# Patient Record
Sex: MALE | Race: WHITE | NOT HISPANIC OR LATINO | URBAN - METROPOLITAN AREA
[De-identification: names, ages, dates, MRNs, and addresses within clinical notes are randomized per-mention and may not be internally consistent; named-entity substitution may affect disease eponyms.]

---

## 2021-05-23 ENCOUNTER — INPATIENT (INPATIENT)
Facility: HOSPITAL | Age: 66
LOS: 2 days | Discharge: HOME | DRG: 444 | End: 2021-05-26
Attending: SURGERY | Admitting: SURGERY
Payer: MEDICARE

## 2021-05-23 VITALS
RESPIRATION RATE: 18 BRPM | HEIGHT: 72 IN | HEART RATE: 74 BPM | OXYGEN SATURATION: 97 % | SYSTOLIC BLOOD PRESSURE: 167 MMHG | WEIGHT: 177.03 LBS | DIASTOLIC BLOOD PRESSURE: 76 MMHG | TEMPERATURE: 98 F

## 2021-05-23 LAB
AFP-TM SERPL-MCNC: 8.1 NG/ML — SIGNIFICANT CHANGE UP
ALBUMIN SERPL ELPH-MCNC: 3.8 G/DL — SIGNIFICANT CHANGE UP (ref 3.3–5)
ALP SERPL-CCNC: 1258 U/L — HIGH (ref 40–120)
ALT FLD-CCNC: 141 U/L — HIGH (ref 10–45)
ANION GAP SERPL CALC-SCNC: 11 MMOL/L — SIGNIFICANT CHANGE UP (ref 5–17)
APPEARANCE UR: ABNORMAL
APTT BLD: 41.1 SEC — HIGH (ref 27.5–35.5)
APTT BLD: 79.6 SEC — HIGH (ref 27.5–35.5)
AST SERPL-CCNC: 154 U/L — HIGH (ref 10–40)
BACTERIA # UR AUTO: SIGNIFICANT CHANGE UP /HPF
BASOPHILS # BLD AUTO: 0.07 K/UL — SIGNIFICANT CHANGE UP (ref 0–0.2)
BASOPHILS NFR BLD AUTO: 0.7 % — SIGNIFICANT CHANGE UP (ref 0–2)
BILIRUB SERPL-MCNC: 5.2 MG/DL — HIGH (ref 0.2–1.2)
BILIRUB UR-MCNC: ABNORMAL
BLD GP AB SCN SERPL QL: NEGATIVE — SIGNIFICANT CHANGE UP
BUN SERPL-MCNC: 14 MG/DL — SIGNIFICANT CHANGE UP (ref 7–23)
CALCIUM SERPL-MCNC: 9.4 MG/DL — SIGNIFICANT CHANGE UP (ref 8.4–10.5)
CANCER AG125 SERPL-ACNC: 8 U/ML — SIGNIFICANT CHANGE UP
CANCER AG19-9 SERPL-ACNC: 412 U/ML — HIGH
CEA SERPL-MCNC: 5.5 NG/ML — HIGH (ref 0–3.8)
CHLORIDE SERPL-SCNC: 101 MMOL/L — SIGNIFICANT CHANGE UP (ref 96–108)
CO2 SERPL-SCNC: 28 MMOL/L — SIGNIFICANT CHANGE UP (ref 22–31)
COD CRY URNS QL: ABNORMAL /HPF
COLOR SPEC: SIGNIFICANT CHANGE UP
COMMENT - URINE: SIGNIFICANT CHANGE UP
CREAT SERPL-MCNC: 1.04 MG/DL — SIGNIFICANT CHANGE UP (ref 0.5–1.3)
DIFF PNL FLD: NEGATIVE — SIGNIFICANT CHANGE UP
EOSINOPHIL # BLD AUTO: 0.12 K/UL — SIGNIFICANT CHANGE UP (ref 0–0.5)
EOSINOPHIL NFR BLD AUTO: 1.2 % — SIGNIFICANT CHANGE UP (ref 0–6)
EPI CELLS # UR: SIGNIFICANT CHANGE UP /HPF (ref 0–5)
GLUCOSE SERPL-MCNC: 104 MG/DL — HIGH (ref 70–99)
GLUCOSE UR QL: NEGATIVE — SIGNIFICANT CHANGE UP
GRAN CASTS # UR COMP ASSIST: ABNORMAL /LPF
HCT VFR BLD CALC: 35.6 % — LOW (ref 39–50)
HCT VFR BLD CALC: 39.1 % — SIGNIFICANT CHANGE UP (ref 39–50)
HGB BLD-MCNC: 11.6 G/DL — LOW (ref 13–17)
HGB BLD-MCNC: 12.9 G/DL — LOW (ref 13–17)
IMM GRANULOCYTES NFR BLD AUTO: 0.2 % — SIGNIFICANT CHANGE UP (ref 0–1.5)
INR BLD: 1.32 — HIGH (ref 0.88–1.16)
KETONES UR-MCNC: NEGATIVE — SIGNIFICANT CHANGE UP
LEUKOCYTE ESTERASE UR-ACNC: NEGATIVE — SIGNIFICANT CHANGE UP
LIDOCAIN IGE QN: 29 U/L — SIGNIFICANT CHANGE UP (ref 7–60)
LYMPHOCYTES # BLD AUTO: 1.64 K/UL — SIGNIFICANT CHANGE UP (ref 1–3.3)
LYMPHOCYTES # BLD AUTO: 16.3 % — SIGNIFICANT CHANGE UP (ref 13–44)
MCHC RBC-ENTMCNC: 28.6 PG — SIGNIFICANT CHANGE UP (ref 27–34)
MCHC RBC-ENTMCNC: 29.2 PG — SIGNIFICANT CHANGE UP (ref 27–34)
MCHC RBC-ENTMCNC: 32.6 GM/DL — SIGNIFICANT CHANGE UP (ref 32–36)
MCHC RBC-ENTMCNC: 33 GM/DL — SIGNIFICANT CHANGE UP (ref 32–36)
MCV RBC AUTO: 87.9 FL — SIGNIFICANT CHANGE UP (ref 80–100)
MCV RBC AUTO: 88.5 FL — SIGNIFICANT CHANGE UP (ref 80–100)
MONOCYTES # BLD AUTO: 0.63 K/UL — SIGNIFICANT CHANGE UP (ref 0–0.9)
MONOCYTES NFR BLD AUTO: 6.3 % — SIGNIFICANT CHANGE UP (ref 2–14)
NEUTROPHILS # BLD AUTO: 7.59 K/UL — HIGH (ref 1.8–7.4)
NEUTROPHILS NFR BLD AUTO: 75.3 % — SIGNIFICANT CHANGE UP (ref 43–77)
NITRITE UR-MCNC: NEGATIVE — SIGNIFICANT CHANGE UP
NRBC # BLD: 0 /100 WBCS — SIGNIFICANT CHANGE UP (ref 0–0)
NRBC # BLD: 0 /100 WBCS — SIGNIFICANT CHANGE UP (ref 0–0)
PH UR: 5.5 — SIGNIFICANT CHANGE UP (ref 5–8)
PLATELET # BLD AUTO: 285 K/UL — SIGNIFICANT CHANGE UP (ref 150–400)
PLATELET # BLD AUTO: 304 K/UL — SIGNIFICANT CHANGE UP (ref 150–400)
POTASSIUM SERPL-MCNC: 3.7 MMOL/L — SIGNIFICANT CHANGE UP (ref 3.5–5.3)
POTASSIUM SERPL-SCNC: 3.7 MMOL/L — SIGNIFICANT CHANGE UP (ref 3.5–5.3)
PROT SERPL-MCNC: 7.4 G/DL — SIGNIFICANT CHANGE UP (ref 6–8.3)
PROT UR-MCNC: 30 MG/DL
PROTHROM AB SERPL-ACNC: 15.6 SEC — HIGH (ref 10.6–13.6)
RBC # BLD: 4.05 M/UL — LOW (ref 4.2–5.8)
RBC # BLD: 4.42 M/UL — SIGNIFICANT CHANGE UP (ref 4.2–5.8)
RBC # FLD: 14 % — SIGNIFICANT CHANGE UP (ref 10.3–14.5)
RBC # FLD: 14.1 % — SIGNIFICANT CHANGE UP (ref 10.3–14.5)
RBC CASTS # UR COMP ASSIST: < 5 /HPF — SIGNIFICANT CHANGE UP
RH IG SCN BLD-IMP: POSITIVE — SIGNIFICANT CHANGE UP
SARS-COV-2 RNA SPEC QL NAA+PROBE: SIGNIFICANT CHANGE UP
SODIUM SERPL-SCNC: 140 MMOL/L — SIGNIFICANT CHANGE UP (ref 135–145)
SP GR SPEC: >=1.03 — SIGNIFICANT CHANGE UP (ref 1–1.03)
UROBILINOGEN FLD QL: 1 E.U./DL — SIGNIFICANT CHANGE UP
WBC # BLD: 10.07 K/UL — SIGNIFICANT CHANGE UP (ref 3.8–10.5)
WBC # BLD: 7.76 K/UL — SIGNIFICANT CHANGE UP (ref 3.8–10.5)
WBC # FLD AUTO: 10.07 K/UL — SIGNIFICANT CHANGE UP (ref 3.8–10.5)
WBC # FLD AUTO: 7.76 K/UL — SIGNIFICANT CHANGE UP (ref 3.8–10.5)
WBC UR QL: < 5 /HPF — SIGNIFICANT CHANGE UP

## 2021-05-23 PROCEDURE — 93010 ELECTROCARDIOGRAM REPORT: CPT

## 2021-05-23 PROCEDURE — 99285 EMERGENCY DEPT VISIT HI MDM: CPT | Mod: CS

## 2021-05-23 PROCEDURE — 76705 ECHO EXAM OF ABDOMEN: CPT | Mod: 26

## 2021-05-23 RX ORDER — SODIUM CHLORIDE 9 MG/ML
1000 INJECTION, SOLUTION INTRAVENOUS
Refills: 0 | Status: DISCONTINUED | OUTPATIENT
Start: 2021-05-23 | End: 2021-05-26

## 2021-05-23 RX ORDER — ACETAMINOPHEN 500 MG
650 TABLET ORAL EVERY 6 HOURS
Refills: 0 | Status: DISCONTINUED | OUTPATIENT
Start: 2021-05-23 | End: 2021-05-26

## 2021-05-23 RX ORDER — PIPERACILLIN AND TAZOBACTAM 4; .5 G/20ML; G/20ML
3.38 INJECTION, POWDER, LYOPHILIZED, FOR SOLUTION INTRAVENOUS ONCE
Refills: 0 | Status: COMPLETED | OUTPATIENT
Start: 2021-05-23 | End: 2021-05-23

## 2021-05-23 RX ORDER — PIPERACILLIN AND TAZOBACTAM 4; .5 G/20ML; G/20ML
3.38 INJECTION, POWDER, LYOPHILIZED, FOR SOLUTION INTRAVENOUS EVERY 6 HOURS
Refills: 0 | Status: DISCONTINUED | OUTPATIENT
Start: 2021-05-23 | End: 2021-05-25

## 2021-05-23 RX ORDER — METOPROLOL TARTRATE 50 MG
25 TABLET ORAL DAILY
Refills: 0 | Status: DISCONTINUED | OUTPATIENT
Start: 2021-05-23 | End: 2021-05-26

## 2021-05-23 RX ORDER — ATORVASTATIN CALCIUM 80 MG/1
20 TABLET, FILM COATED ORAL AT BEDTIME
Refills: 0 | Status: DISCONTINUED | OUTPATIENT
Start: 2021-05-23 | End: 2021-05-24

## 2021-05-23 RX ORDER — ONDANSETRON 8 MG/1
4 TABLET, FILM COATED ORAL EVERY 6 HOURS
Refills: 0 | Status: DISCONTINUED | OUTPATIENT
Start: 2021-05-23 | End: 2021-05-26

## 2021-05-23 RX ORDER — HEPARIN SODIUM 5000 [USP'U]/ML
1500 INJECTION INTRAVENOUS; SUBCUTANEOUS
Qty: 25000 | Refills: 0 | Status: DISCONTINUED | OUTPATIENT
Start: 2021-05-23 | End: 2021-05-24

## 2021-05-23 RX ADMIN — Medication 25 MILLIGRAM(S): at 16:04

## 2021-05-23 RX ADMIN — PIPERACILLIN AND TAZOBACTAM 200 GRAM(S): 4; .5 INJECTION, POWDER, LYOPHILIZED, FOR SOLUTION INTRAVENOUS at 21:58

## 2021-05-23 RX ADMIN — PIPERACILLIN AND TAZOBACTAM 200 GRAM(S): 4; .5 INJECTION, POWDER, LYOPHILIZED, FOR SOLUTION INTRAVENOUS at 17:48

## 2021-05-23 RX ADMIN — HEPARIN SODIUM 1400 UNIT(S)/HR: 5000 INJECTION INTRAVENOUS; SUBCUTANEOUS at 14:57

## 2021-05-23 NOTE — H&P ADULT - NSHPPHYSICALEXAM_GEN_ALL_CORE
Physical Exam:  General: NAD, jaundiced. Yellow sclera  Abdominal: soft, NT/ND. No scare.  Extremities: WWP.

## 2021-05-23 NOTE — H&P ADULT - ASSESSMENT
66 YO M with PMH s/f HTN, Afib (s/p ablation 2 years ago, on Eliquis) and no significant PSH. Patient has had 35-40 pounds weight loss in the last month a/w nausea, vomitings not associated with food, darkening of urine color and jaundice. Saw his PCP last week and elevated LFTs were noted. Patient got CT AP which revealed dilated intra and extrahepatic bile ducts, CBD 1.4 cm and a 1 cm nonobstructive CBD stone. Admitted for ERCP with Dr. Davis. Colonoscopy in April 2021 which revealed to polyps.    Plan  - Admit Team 5 under Dr. Burgos Tele  - MRCP today  - GI consult with Dr. Davis  - Holding Home Eliquis  - Therapeutic Hep drip  - Holding antibiotics  - Diet after MRCP  - Plan discussed with chief and attending

## 2021-05-23 NOTE — ED ADULT TRIAGE NOTE - CHIEF COMPLAINT QUOTE
Pt presents to ED, referred by MD Munguia for admission for confirmed Cholecystitis. PT denies pain, states N/V everytime he eats.

## 2021-05-23 NOTE — PATIENT PROFILE ADULT - NSPROMEDSDISPOSITION_GEN_A_NUR
in the pt's bag, doesn't have the original container to be sent to pharmacy, pt won't take any./bedside

## 2021-05-23 NOTE — ED PROVIDER NOTE - PHYSICAL EXAMINATION
CONSTITUTIONAL: Well-appearing; well-nourished; in no apparent distress.   HEAD: Normocephalic; atraumatic.   EYES:  +scleral icterus  ENT: normal nose; no rhinorrhea; normal pharynx with no erythema or lesions.   NECK: Supple; non-tender;   CARDIOVASCULAR: Normal S1, S2; no murmurs, rubs, or gallops. Regular rate and rhythm.   RESPIRATORY: Breathing easily; breath sounds clear and equal bilaterally; no wheezes, rhonchi, or rales.  GI: Soft; non-distended; non-tender; no palpable organomegaly.   EXT: No cyanosis or edema; N/V intact  SKIN: Normal for age and race; warm; dry; good turgor; no apparent lesions or rash.   NEURO: A & O x 3; face symmetric; grossly unremarkable.   PSYCHOLOGICAL: The patient’s mood and manner are appropriate.

## 2021-05-23 NOTE — PATIENT PROFILE ADULT - FUNCTIONAL SCREEN CURRENT LEVEL: COMMUNICATION, MLM
2 yo  LMP 2020 IUP 11wks 5 days presents to the ED last night w heavy vaginal bleeding at 2am this morning. Pt states that she began spotting yesterday around noon and around 2 am she was awoken with abd cramping and heavier bleeding w clots. May have passed fetus and placenta removed in the ED, sent to pathology. Evaluated by OB, discussed D&C vs Misoprostol. Misoprostol given and pt reevaluated 1 hr later. Stable for DC. Returns now to the ED stating that as soon as she got home, a gush of blood came out, w blood coming down the legs. Returned to the ER as she state she was advised to come back if bleeding recurs or gets worse. Pt denies fever, chills, pelvic pain, cp, sob, palpitations, n/v/d/c, lightheadedness, dizziness, or syncope. Offers no other complaints at this time. 0 = understands/communicates without difficulty

## 2021-05-23 NOTE — ED ADULT NURSE NOTE - OBJECTIVE STATEMENT
pt states that he was referred in by PCD with a dx of gallstones, pt feeling unwell x 1 month with pain , nausea ,wgt loss, pt appears jaundice with yellow sclera, denies any pain

## 2021-05-23 NOTE — H&P ADULT - HISTORY OF PRESENT ILLNESS
66 YO M with PMH s/f HTN, Afib (s/p ablation 2 years ago, on Eliquis) and no significant PSH. Patient has had 35-40 pounds weight loss in the last month a/w nausea, vomitings not associated with food, darkening of urine color and jaundice. Saw his PCP last week and elevated LFTs were noted. Patient got CT AP which revealed dilated intra and extrahepatic bile ducts, CBD 1.4 cm and a 1 cm nonobstructing CBD stone. Plan was for the patient to get admitted for a planned ERCP procedure and patient was asked to come to the ED today. Denies fevers but does expreince chills from time to time (last episode of chills last week). Colonoscopy in April 2021 which revealed to polyps.    In the ED, VSS. T. bili 5.2, ALK 1200, AST and ALT in 100s. CBS WNL.

## 2021-05-23 NOTE — ED PROVIDER NOTE - OBJECTIVE STATEMENT
64yo M referred to ED by surgeon Dr. Jae Burgos for CBD stone w/ biliary obstruction. pt w/ nausea and weight loss for several weeks, outpatient labs showed elevated LFTs, developed scleral icterus a few days ago. had outpatient US on Friday, called to come to the ED today by Dr. Burgos as it shows cbd stone. pt states never with any pain. denies skin jaundice.  hx afib s/p ablation, states has been in NSR since, but maintained on eliquis - held am dose today in anticipation of poss procedure.

## 2021-05-24 LAB
ALBUMIN SERPL ELPH-MCNC: 3.1 G/DL — LOW (ref 3.3–5)
ALP SERPL-CCNC: 1126 U/L — HIGH (ref 40–120)
ALT FLD-CCNC: 128 U/L — HIGH (ref 10–45)
ANION GAP SERPL CALC-SCNC: 11 MMOL/L — SIGNIFICANT CHANGE UP (ref 5–17)
AST SERPL-CCNC: 133 U/L — HIGH (ref 10–40)
BILIRUB SERPL-MCNC: 5.7 MG/DL — HIGH (ref 0.2–1.2)
BUN SERPL-MCNC: 9 MG/DL — SIGNIFICANT CHANGE UP (ref 7–23)
CALCIUM SERPL-MCNC: 9.1 MG/DL — SIGNIFICANT CHANGE UP (ref 8.4–10.5)
CHLORIDE SERPL-SCNC: 101 MMOL/L — SIGNIFICANT CHANGE UP (ref 96–108)
CO2 SERPL-SCNC: 27 MMOL/L — SIGNIFICANT CHANGE UP (ref 22–31)
COVID-19 SPIKE DOMAIN AB INTERP: POSITIVE
COVID-19 SPIKE DOMAIN ANTIBODY RESULT: >250 U/ML — HIGH
CREAT SERPL-MCNC: 0.97 MG/DL — SIGNIFICANT CHANGE UP (ref 0.5–1.3)
CULTURE RESULTS: NO GROWTH — SIGNIFICANT CHANGE UP
GLUCOSE SERPL-MCNC: 92 MG/DL — SIGNIFICANT CHANGE UP (ref 70–99)
HCT VFR BLD CALC: 35.8 % — LOW (ref 39–50)
HCV AB S/CO SERPL IA: 0.03 S/CO — SIGNIFICANT CHANGE UP
HCV AB SERPL-IMP: SIGNIFICANT CHANGE UP
HGB BLD-MCNC: 11.6 G/DL — LOW (ref 13–17)
MAGNESIUM SERPL-MCNC: 2 MG/DL — SIGNIFICANT CHANGE UP (ref 1.6–2.6)
MCHC RBC-ENTMCNC: 28.6 PG — SIGNIFICANT CHANGE UP (ref 27–34)
MCHC RBC-ENTMCNC: 32.4 GM/DL — SIGNIFICANT CHANGE UP (ref 32–36)
MCV RBC AUTO: 88.2 FL — SIGNIFICANT CHANGE UP (ref 80–100)
NRBC # BLD: 0 /100 WBCS — SIGNIFICANT CHANGE UP (ref 0–0)
PHOSPHATE SERPL-MCNC: 3.3 MG/DL — SIGNIFICANT CHANGE UP (ref 2.5–4.5)
PLATELET # BLD AUTO: 279 K/UL — SIGNIFICANT CHANGE UP (ref 150–400)
POTASSIUM SERPL-MCNC: 3.4 MMOL/L — LOW (ref 3.5–5.3)
POTASSIUM SERPL-SCNC: 3.4 MMOL/L — LOW (ref 3.5–5.3)
PROT SERPL-MCNC: 6.3 G/DL — SIGNIFICANT CHANGE UP (ref 6–8.3)
RBC # BLD: 4.06 M/UL — LOW (ref 4.2–5.8)
RBC # FLD: 13.8 % — SIGNIFICANT CHANGE UP (ref 10.3–14.5)
SARS-COV-2 IGG+IGM SERPL QL IA: >250 U/ML — HIGH
SARS-COV-2 IGG+IGM SERPL QL IA: POSITIVE
SODIUM SERPL-SCNC: 139 MMOL/L — SIGNIFICANT CHANGE UP (ref 135–145)
SPECIMEN SOURCE: SIGNIFICANT CHANGE UP
WBC # BLD: 7.14 K/UL — SIGNIFICANT CHANGE UP (ref 3.8–10.5)
WBC # FLD AUTO: 7.14 K/UL — SIGNIFICANT CHANGE UP (ref 3.8–10.5)

## 2021-05-24 PROCEDURE — 71045 X-RAY EXAM CHEST 1 VIEW: CPT | Mod: 26

## 2021-05-24 PROCEDURE — 99223 1ST HOSP IP/OBS HIGH 75: CPT | Mod: GC

## 2021-05-24 RX ORDER — LISINOPRIL 2.5 MG/1
0 TABLET ORAL
Qty: 0 | Refills: 0 | DISCHARGE

## 2021-05-24 RX ORDER — ROSUVASTATIN CALCIUM 5 MG/1
1 TABLET ORAL
Qty: 0 | Refills: 0 | DISCHARGE

## 2021-05-24 RX ORDER — METOPROLOL TARTRATE 50 MG
1 TABLET ORAL
Qty: 0 | Refills: 0 | DISCHARGE

## 2021-05-24 RX ORDER — HEPARIN SODIUM 5000 [USP'U]/ML
5000 INJECTION INTRAVENOUS; SUBCUTANEOUS EVERY 8 HOURS
Refills: 0 | Status: DISCONTINUED | OUTPATIENT
Start: 2021-05-24 | End: 2021-05-26

## 2021-05-24 RX ORDER — APIXABAN 2.5 MG/1
1 TABLET, FILM COATED ORAL
Qty: 0 | Refills: 0 | DISCHARGE

## 2021-05-24 RX ORDER — ROSUVASTATIN CALCIUM 5 MG/1
0 TABLET ORAL
Qty: 0 | Refills: 0 | DISCHARGE

## 2021-05-24 RX ORDER — METOPROLOL TARTRATE 50 MG
0 TABLET ORAL
Qty: 0 | Refills: 0 | DISCHARGE

## 2021-05-24 RX ORDER — APIXABAN 2.5 MG/1
0 TABLET, FILM COATED ORAL
Qty: 0 | Refills: 0 | DISCHARGE

## 2021-05-24 RX ORDER — POTASSIUM CHLORIDE 20 MEQ
10 PACKET (EA) ORAL
Refills: 0 | Status: COMPLETED | OUTPATIENT
Start: 2021-05-24 | End: 2021-05-24

## 2021-05-24 RX ORDER — LISINOPRIL 2.5 MG/1
1 TABLET ORAL
Qty: 0 | Refills: 0 | DISCHARGE

## 2021-05-24 RX ADMIN — HEPARIN SODIUM 5000 UNIT(S): 5000 INJECTION INTRAVENOUS; SUBCUTANEOUS at 22:00

## 2021-05-24 RX ADMIN — Medication 100 MILLIEQUIVALENT(S): at 09:57

## 2021-05-24 RX ADMIN — Medication 100 MILLIEQUIVALENT(S): at 10:40

## 2021-05-24 RX ADMIN — PIPERACILLIN AND TAZOBACTAM 200 GRAM(S): 4; .5 INJECTION, POWDER, LYOPHILIZED, FOR SOLUTION INTRAVENOUS at 04:57

## 2021-05-24 RX ADMIN — PIPERACILLIN AND TAZOBACTAM 200 GRAM(S): 4; .5 INJECTION, POWDER, LYOPHILIZED, FOR SOLUTION INTRAVENOUS at 17:00

## 2021-05-24 RX ADMIN — PIPERACILLIN AND TAZOBACTAM 200 GRAM(S): 4; .5 INJECTION, POWDER, LYOPHILIZED, FOR SOLUTION INTRAVENOUS at 11:22

## 2021-05-24 RX ADMIN — Medication 25 MILLIGRAM(S): at 08:31

## 2021-05-24 RX ADMIN — Medication 100 MILLIEQUIVALENT(S): at 08:55

## 2021-05-24 RX ADMIN — PIPERACILLIN AND TAZOBACTAM 200 GRAM(S): 4; .5 INJECTION, POWDER, LYOPHILIZED, FOR SOLUTION INTRAVENOUS at 22:21

## 2021-05-24 RX ADMIN — SODIUM CHLORIDE 100 MILLILITER(S): 9 INJECTION, SOLUTION INTRAVENOUS at 04:57

## 2021-05-24 RX ADMIN — SODIUM CHLORIDE 100 MILLILITER(S): 9 INJECTION, SOLUTION INTRAVENOUS at 15:42

## 2021-05-24 NOTE — CONSULT NOTE ADULT - ASSESSMENT
66 YO M with PMH s/f HTN, Afib (s/p ablation 2 years ago, on Eliquis) and no significant PSH. Patient has had 35-40 pounds weight loss in the last month a/w nausea, vomitings not associated with food, darkening of urine color and jaundice. GI consulted for choledocholithiasis.     #Choledocholithiasis  - reportedly had outpatient imaging that showed 1 cm non-obstructing CBD stone  - prelim U/S inpatient shows dilated ducts, but no obvious stone  - cholestatic injury on labs  - please hold Eliquis  - please hold heparin gtt at midnight tonight and make patient NPO past midnight for ERCP tomorrow  - f/u MRCP and tumor markers given weight loss and painless jaundice     d/w Dr. Taveras and Dr. Ryan Sutherland MD  PGY-4, Gastroenterology Fellow  pager: [phone]
66 YO M with PMH s/f HTN, Afib. CT AP which revealed dilated intra and extrahepatic bile ducts, CBD 1.4 cm and a 1 cm nonobstructive CBD stone. Admitted for ERCP. Medicine consulted for preop eval for lap gail tomorrow.     #Choledocholithiasis   Patient s/p ERCP,  11 mm CBD stone visualized in distal CBD and CBD dilated to 13 mm s/p sphincterotomy and balloon sweep  Plan per primary team   CW IV abx per team   pain control, trend CMP    #Preop Eval  METs>10, performs ADLs independently   RCRI 1, Class II risk for KLALIE   Schumacher 0.0% KALLIE  Can proceed to OR without further cardiac workup     #Afib   On eliquis and Toprol at home   Hold eliquis preop, resume post op per primary team   CW Toprol 25mg QD     #HTN   On lisinopril 20mg QD at home  Hold preop at this time   If SBP>180, can consider IV labetolol 10mg or hydral 10mg IVP  Resume lisinopril post op   Ensure adequate pain control prior to treating BP    #HLD   On crestor 20mg QHS, hold in setting of transaminitis     #Anemia   No e/o bleeding at this time   Continue to trend CBC   monitor BM - had one today and notes it was normal    #Hypokalemia   Replete >4

## 2021-05-24 NOTE — DIETITIAN INITIAL EVALUATION ADULT. - OTHER INFO
65M w/ PMH s/f HTN, Afib (s/p ablation 2 years ago, on Eliquis) and no significant PSH. The patient has had 35-40 pounds weight loss in the last month a/w nausea, vomiting, not associated w/ food, darkening of urine color and jaundice. Saw his PCP last week and elevated LFTs were noted. He got CT A/P, which revealed dilated intra- and extra-hepatic bile ducts, CBD 1.4 cm and a 1 cm non-obstructive CBD stone. Plan for ERCP today 5/24.     On assessment, pt resting in bed without complaints. Currently NPO for testing today. No reported n/v/d/c since admission. Last BM 5/22. Skin WDL, naomi score 22. No pain reported at time of assessment. Pt reported over the last month, he has had a significant amount of wt loss. Noted UBW was 220lbs and now admitting at 177lbs revealing a -43lb/ 19.5% weight loss over 1 month. Noted that appetite was good but had frequent N/V after dinner and some lunches. Suspect meeting <75% est needs x1 month. NFPE was unremarkable. Per ASPEN guidelines, pt meets criteria for severe malnutrition- team made aware. Pt onboard with deferring education until diet is advanced- RD to follow. Pertinent Labs; K 3.4L, ALK Phos 1126H, AST 133H, ALT 128H. Cont to monitor lytes and replete prn. Please see nutr recs below. RD to follow.

## 2021-05-24 NOTE — DIETITIAN NUTRITION RISK NOTIFICATION - ADDITIONAL COMMENTS/DIETITIAN RECOMMENDATIONS
1. NPO  >> Recommend advance to low fat diet when medically feasible  2. pain and bowel regime per team   3. Cont to monitor lytes and replete prn   4. RD diet edu prn

## 2021-05-24 NOTE — DIETITIAN INITIAL EVALUATION ADULT. - OTHER CALCULATIONS
ABW used for calculations as pt between % of IBW. (99%). Needs adjusted for advanced age and severe malnutrition.

## 2021-05-24 NOTE — CONSULT NOTE ADULT - SUBJECTIVE AND OBJECTIVE BOX
Patient is a 65y old  Male who presents with a chief complaint of Jaundice 2/2 non-obstructive (24 May 2021 13:48)    HPI:  66 YO M with PMH s/f HTN, Afib (s/p ablation 2 years ago, on Eliquis) and no significant PSH. Patient has had 35-40 pounds weight loss in the last month a/w nausea, vomitings not associated with food, darkening of urine color and jaundice. Saw his PCP last week and elevated LFTs were noted. Patient got CT AP which revealed dilated intra and extrahepatic bile ducts, CBD 1.4 cm and a 1 cm nonobstructing CBD stone. Plan was for the patient to get admitted for a planned ERCP procedure and patient was asked to come to the ED today. Denies fevers but does expreince chills from time to time (last episode of chills last week). Colonoscopy in April 2021 which revealed to polyps.    In the ED, VSS. T. bili 5.2, ALK 1200, AST and ALT in 100s. CBS WNL.   (23 May 2021 13:26)      INTERVAL HPI/OVERNIGHT EVENTS:  Patient was seen and examined at bedside. As per nurse and patient, no o/n events, patient resting comfortably. No complaints at this time. States that he feels as though he is back at his baseline. Patient denies: fever, chills, dizziness, weakness, HA, Changes in vision, CP, palpitations, SOB, cough, N/V/D/C, dysuria, changes in bowel movements, LE edema.      PAST MEDICAL & SURGICAL HISTORY:  Hypertension    High cholesterol    Atrial fibrillation    No significant past surgical history        SOCIAL HISTORY  Alcohol: denies  Tobacco: denies  Illicit substance use: denies      FAMILY HISTORY:    REVIEW OF SYSTEMS:  CONSTITUTIONAL: No fever, weight loss, or fatigue  EYES: No eye pain, visual disturbances, or discharge  ENMT:  No difficulty hearing, tinnitus, vertigo; No sinus or throat pain  NECK: No pain or stiffness  RESPIRATORY: No cough, wheezing, chills or hemoptysis; No shortness of breath  CARDIOVASCULAR: No chest pain, palpitations, dizziness, or leg swelling  GASTROINTESTINAL: No abdominal or epigastric pain. No nausea, vomiting, or hematemesis; No diarrhea or constipation. No melena or hematochezia.  GENITOURINARY: No dysuria, frequency, hematuria, or incontinence  NEUROLOGICAL: No headaches, memory loss, loss of strength, numbness, or tremors  SKIN: No itching, burning, rashes, or lesions   LYMPH NODES: No enlarged glands  ENDOCRINE: No heat or cold intolerance; No hair loss  MUSCULOSKELETAL: No joint pain or swelling; No muscle, back, or extremity pain  PSYCHIATRIC: No depression, anxiety, mood swings, or difficulty sleeping  HEME/LYMPH: No easy bruising, or bleeding gums  ALLERY AND IMMUNOLOGIC: No hives or eczema    T(C): 36.6 (05-24-21 @ 08:45), Max: 36.9 (05-24-21 @ 00:29)  HR: 67 (05-24-21 @ 08:45) (65 - 80)  BP: 167/86 (05-24-21 @ 08:45) (137/66 - 175/86)  RR: 16 (05-24-21 @ 08:45) (15 - 19)  SpO2: 99% (05-24-21 @ 08:45) (98% - 99%)  Wt(kg): --  I&O's Summary    23 May 2021 07:01  -  24 May 2021 07:00  --------------------------------------------------------  IN: 956 mL / OUT: 1250 mL / NET: -294 mL    24 May 2021 07:01  -  24 May 2021 15:33  --------------------------------------------------------  IN: 450 mL / OUT: 500 mL / NET: -50 mL        PHYSICAL EXAM:  GENERAL: NAD, laying comfortably in bed  HEAD:  Atraumatic, Normocephalic  EYES: EOMI, PERRLA, +scleral icterus  ENMT: No tonsillar erythema, exudates, or enlargement; MMM  NECK: Supple, No JVD  NERVOUS SYSTEM:  Alert & Oriented X3, no focal deficits   CHEST/LUNG: Clear to percussion bilaterally; No rales, rhonchi, wheezing, or rubs  HEART: Regular rate and rhythm; No murmurs, rubs, or gallops  ABDOMEN: Soft, Nontender, Nondistended; Bowel sounds present  EXTREMITIES:  2+ Peripheral Pulses, No clubbing, cyanosis, or edema  LYMPH: No lymphadenopathy noted  SKIN: +jaundice        LABS:                        11.6   7.14  )-----------( 279      ( 24 May 2021 06:47 )             35.8     05-24    139  |  101  |  9   ----------------------------<  92  3.4<L>   |  27  |  0.97    Ca    9.1      24 May 2021 06:47  Phos  3.3     05-24  Mg     2.0     05-24    TPro  6.3  /  Alb  3.1<L>  /  TBili  5.7<H>  /  DBili  x   /  AST  133<H>  /  ALT  128<H>  /  AlkPhos  1126<H>  05-24    PT/INR - ( 23 May 2021 10:28 )   PT: 15.6 sec;   INR: 1.32          PTT - ( 23 May 2021 22:25 )  PTT:79.6 sec  Urinalysis Basic - ( 23 May 2021 13:55 )    Color: Dark Yellow / Appearance: Hazy / SG: >=1.030 / pH: x  Gluc: x / Ketone: NEGATIVE  / Bili: Moderate / Urobili: 1.0 E.U./dL   Blood: x / Protein: 30 mg/dL / Nitrite: NEGATIVE   Leuk Esterase: NEGATIVE / RBC: < 5 /HPF / WBC < 5 /HPF   Sq Epi: x / Non Sq Epi: 0-5 /HPF / Bacteria: None /HPF      CAPILLARY BLOOD GLUCOSE            Urinalysis Basic - ( 23 May 2021 13:55 )    Color: Dark Yellow / Appearance: Hazy / SG: >=1.030 / pH: x  Gluc: x / Ketone: NEGATIVE  / Bili: Moderate / Urobili: 1.0 E.U./dL   Blood: x / Protein: 30 mg/dL / Nitrite: NEGATIVE   Leuk Esterase: NEGATIVE / RBC: < 5 /HPF / WBC < 5 /HPF   Sq Epi: x / Non Sq Epi: 0-5 /HPF / Bacteria: None /HPF        MEDICATIONS  (STANDING):  atorvastatin 20 milliGRAM(s) Oral at bedtime  lactated ringers. 1000 milliLiter(s) (100 mL/Hr) IV Continuous <Continuous>  metoprolol succinate ER 25 milliGRAM(s) Oral daily  piperacillin/tazobactam IVPB.. 3.375 Gram(s) IV Intermittent every 6 hours    MEDICATIONS  (PRN):  acetaminophen   Tablet .. 650 milliGRAM(s) Oral every 6 hours PRN Mild Pain (1 - 3)  ondansetron Injectable 4 milliGRAM(s) IV Push every 6 hours PRN Nausea      RADIOLOGY & ADDITIONAL TESTS:    Imaging Personally Reviewed:  [ ] YES  [ ] NO    Consultant(s) Notes Reviewed:  [ ] YES  [ ] NO    Care Discussed with Consultants/Other Providers [ ] YES  [ ] NO
GASTROENTEROLOGY CONSULT NOTE  HPI:  64 YO M with PMH s/f HTN, Afib (s/p ablation 2 years ago, on Eliquis) and no significant PSH. Patient has had 35-40 pounds weight loss in the last month a/w nausea, vomitings not associated with food, darkening of urine color and jaundice. Saw his PCP last week and elevated LFTs were noted. Patient got CT AP which revealed dilated intra and extrahepatic bile ducts, CBD 1.4 cm and a 1 cm nonobstructing CBD stone. Plan was for the patient to get admitted for a planned ERCP procedure and patient was asked to come to the ED today. Denies fevers but does expreince chills from time to time (last episode of chills last week). Colonoscopy in April 2021 which revealed to polyps.    In the ED, VSS. T. bili 5.2, ALK 1200, AST and ALT in 100s. CBS WNL.   (23 May 2021 13:26)    GI consulted for choledocholithiasis. Patient seen and examined at bedside.     Allergies    No Known Allergies    Intolerances      Home Medications:  Crestor:  (23 May 2021 10:30)  Eliquis:  (23 May 2021 10:30)  lisinopril:  (23 May 2021 10:30)  metoprolol:  (23 May 2021 10:30)    MEDICATIONS:  MEDICATIONS  (STANDING):  atorvastatin 20 milliGRAM(s) Oral at bedtime  heparin  Infusion. 1500 Unit(s)/Hr (14 mL/Hr) IV Continuous <Continuous>  metoprolol succinate ER 25 milliGRAM(s) Oral daily  piperacillin/tazobactam IVPB. 3.375 Gram(s) IV Intermittent once  piperacillin/tazobactam IVPB.. 3.375 Gram(s) IV Intermittent every 6 hours    MEDICATIONS  (PRN):  acetaminophen   Tablet .. 650 milliGRAM(s) Oral every 6 hours PRN Mild Pain (1 - 3)  ondansetron Injectable 4 milliGRAM(s) IV Push every 6 hours PRN Nausea    PAST MEDICAL & SURGICAL HISTORY:  Hypertension    High cholesterol    Atrial fibrillation    No significant past surgical history      FAMILY HISTORY:  No pertinent family history in first degree relatives      SOCIAL HISTORY:  Tobacco: [ ] Current, [ ] Former, [ ] Never; Pack Years:  Alcohol:  Illicit Drugs:    REVIEW OF SYSTEMS:  CONSTITUTIONAL: No weakness, fevers or chills  HEENT: No visual changes; No vertigo or throat pain   NECK: No pain or stiffness  RESPIRATORY: No cough, wheezing, hemoptysis; No shortness of breath  CARDIOVASCULAR: No chest pain or palpitations  GASTROINTESTINAL: As above.  GENITOURINARY: No dysuria, frequency or hematuria  NEUROLOGICAL: No numbness or weakness  SKIN: No itching, burning, rashes, or lesions   All other 10 review of systems is negative unless indicated above.    Vital Signs Last 24 Hrs  T(C): 36.5 (23 May 2021 16:35), Max: 36.9 (23 May 2021 14:38)  T(F): 97.7 (23 May 2021 16:35), Max: 98.4 (23 May 2021 14:38)  HR: 80 (23 May 2021 16:35) (63 - 80)  BP: 149/81 (23 May 2021 16:35) (147/76 - 175/86)  BP(mean): --  RR: 19 (23 May 2021 16:35) (18 - 19)  SpO2: 98% (23 May 2021 16:35) (97% - 99%)      PHYSICAL EXAM:    General: Well developed; well nourished; in no acute distress  Eyes: +icteric sclerae, moist conjunctivae  HENT: Moist mucous membranes  Neck: Trachea midline, supple  Lungs: Normal respiratory effort, no intercostal retractions  Cardiovascular: RRR  Abdomen: Soft, non-tender non-distended; Normal bowel sounds; No rebound or guarding  Extremities: Normal range of motion, No clubbing, cyanosis or edema  Neurological: Alert and oriented x3  Skin: Warm and dry. +jaundice    LABS:                        12.9   10.07 )-----------( 304      ( 23 May 2021 10:28 )             39.1     05-23    140  |  101  |  14  ----------------------------<  104<H>  3.7   |  28  |  1.04    Ca    9.4      23 May 2021 10:28    TPro  7.4  /  Alb  3.8  /  TBili  5.2<H>  /  DBili  x   /  AST  154<H>  /  ALT  141<H>  /  AlkPhos  1258<H>  05-23        PT/INR - ( 23 May 2021 10:28 )   PT: 15.6 sec;   INR: 1.32          PTT - ( 23 May 2021 10:28 )  PTT:41.1 sec    RADIOLOGY & ADDITIONAL STUDIES:     Reviewed

## 2021-05-24 NOTE — DIETITIAN NUTRITION RISK NOTIFICATION - TREATMENT: THE FOLLOWING DIET HAS BEEN RECOMMENDED
Diet, NPO (05-23-21 @ 17:37) [Active]  Diet, NPO after Midnight:      NPO Start Date: 23-May-2021,   NPO Start Time: 23:59 (05-23-21 @ 13:45) [Active]

## 2021-05-24 NOTE — CHART NOTE - NSCHARTNOTEFT_GEN_A_CORE
Patient is s/p ERCP w/ Dr. Davis w/ the following findings and recommendations.     Impression:  1. 11 mm CBD stone visualized in distal CBD and CBD dilated to 13 mm s/p sphincterotomy and balloon sweep    Recommendations:  1. Clear liquid diet  2. Trend CMP    Jatinder Sutherland MD  PGY-4, Gastroenterology Fellow  pager: [phone]

## 2021-05-24 NOTE — PROGRESS NOTE ADULT - ASSESSMENT
64 yo M w/ PMH s/f HTN, Afib (s/p ablation 2 years ago, on Eliquis) and no significant PSH. The patient has had 35-40 pounds weight loss in the last month a/w nausea, vomiting, not associated w/ food, darkening of urine color and jaundice. Saw his PCP last week and elevated LFTs were noted. He got CT A/P, which revealed dilated intra- and extra-hepatic bile ducts, CBD 1.4 cm and a 1 cm non-obstructive CBD stone.     Patient with weight loss, dilated biliary ducts, N/V, cholestasis, asymptomatic  NPO/IVF  Pain and Nausea control  Zosyn  Heparin gtt., aPTT Q6 (holding for ERCP), SCDs for DVT ppx.  MRCP, ERCP  GI consult  OOBA, IS  AM labs  ERCP 5/24 AM (Dr. Davis

## 2021-05-24 NOTE — PROGRESS NOTE ADULT - SUBJECTIVE AND OBJECTIVE BOX
SUBJECTIVE: Patient seen and examined bedside by chief resident. Patient reports no symptoms, feels fine, understands the need for the ERCP today      metoprolol succinate ER 25 milliGRAM(s) Oral daily  piperacillin/tazobactam IVPB.. 3.375 Gram(s) IV Intermittent every 6 hours      Vital Signs Last 24 Hrs  T(C): 36.7 (24 May 2021 05:00), Max: 36.9 (23 May 2021 14:38)  T(F): 98.1 (24 May 2021 05:00), Max: 98.4 (23 May 2021 14:38)  HR: 70 (24 May 2021 05:00) (63 - 80)  BP: 137/66 (24 May 2021 05:00) (137/66 - 175/86)  BP(mean): --  RR: 15 (24 May 2021 05:00) (15 - 19)  SpO2: 98% (24 May 2021 05:00) (97% - 99%)  I&O's Detail    23 May 2021 07:01  -  24 May 2021 06:59  --------------------------------------------------------  IN:    Heparin Infusion: 56 mL    Lactated Ringers: 900 mL  Total IN: 956 mL    OUT:    Voided (mL): 1250 mL  Total OUT: 1250 mL    Total NET: -294 mL          PHYSICAL EXAMINATION   General: NAD, icteric mucous membrane   NEURO:  follows commands.   PULM: nonlabored breathing, no respiratory distress  ABD: soft, nondistended, NT, no rebound  EXTREM: WWP, no edema, no calf tenderness  PSYCH: Appropriate affect, answers questions appropriately      LABS:                        11.6   7.14  )-----------( 279      ( 24 May 2021 06:47 )             35.8     05-23    140  |  101  |  14  ----------------------------<  104<H>  3.7   |  28  |  1.04    Ca    9.4      23 May 2021 10:28    TPro  7.4  /  Alb  3.8  /  TBili  5.2<H>  /  DBili  x   /  AST  154<H>  /  ALT  141<H>  /  AlkPhos  1258<H>  05-23    PT/INR - ( 23 May 2021 10:28 )   PT: 15.6 sec;   INR: 1.32          PTT - ( 23 May 2021 22:25 )  PTT:79.6 sec  Urinalysis Basic - ( 23 May 2021 13:55 )    Color: Dark Yellow / Appearance: Hazy / SG: >=1.030 / pH: x  Gluc: x / Ketone: NEGATIVE  / Bili: Moderate / Urobili: 1.0 E.U./dL   Blood: x / Protein: 30 mg/dL / Nitrite: NEGATIVE   Leuk Esterase: NEGATIVE / RBC: < 5 /HPF / WBC < 5 /HPF   Sq Epi: x / Non Sq Epi: 0-5 /HPF / Bacteria: None /HPF

## 2021-05-25 LAB
ALBUMIN SERPL ELPH-MCNC: 3.1 G/DL — LOW (ref 3.3–5)
ALP SERPL-CCNC: 974 U/L — HIGH (ref 40–120)
ALT FLD-CCNC: 98 U/L — HIGH (ref 10–45)
ANION GAP SERPL CALC-SCNC: 11 MMOL/L — SIGNIFICANT CHANGE UP (ref 5–17)
APTT BLD: 36.2 SEC — HIGH (ref 27.5–35.5)
AST SERPL-CCNC: 74 U/L — HIGH (ref 10–40)
BILIRUB SERPL-MCNC: 3 MG/DL — HIGH (ref 0.2–1.2)
BLD GP AB SCN SERPL QL: NEGATIVE — SIGNIFICANT CHANGE UP
BUN SERPL-MCNC: 7 MG/DL — SIGNIFICANT CHANGE UP (ref 7–23)
CALCIUM SERPL-MCNC: 9.3 MG/DL — SIGNIFICANT CHANGE UP (ref 8.4–10.5)
CHLORIDE SERPL-SCNC: 104 MMOL/L — SIGNIFICANT CHANGE UP (ref 96–108)
CO2 SERPL-SCNC: 28 MMOL/L — SIGNIFICANT CHANGE UP (ref 22–31)
CREAT SERPL-MCNC: 1.06 MG/DL — SIGNIFICANT CHANGE UP (ref 0.5–1.3)
GLUCOSE SERPL-MCNC: 99 MG/DL — SIGNIFICANT CHANGE UP (ref 70–99)
HCT VFR BLD CALC: 36.2 % — LOW (ref 39–50)
HGB BLD-MCNC: 11.8 G/DL — LOW (ref 13–17)
INR BLD: 1.1 — SIGNIFICANT CHANGE UP (ref 0.88–1.16)
MAGNESIUM SERPL-MCNC: 2 MG/DL — SIGNIFICANT CHANGE UP (ref 1.6–2.6)
MCHC RBC-ENTMCNC: 29.3 PG — SIGNIFICANT CHANGE UP (ref 27–34)
MCHC RBC-ENTMCNC: 32.6 GM/DL — SIGNIFICANT CHANGE UP (ref 32–36)
MCV RBC AUTO: 89.8 FL — SIGNIFICANT CHANGE UP (ref 80–100)
NRBC # BLD: 0 /100 WBCS — SIGNIFICANT CHANGE UP (ref 0–0)
PHOSPHATE SERPL-MCNC: 2.7 MG/DL — SIGNIFICANT CHANGE UP (ref 2.5–4.5)
PLATELET # BLD AUTO: 324 K/UL — SIGNIFICANT CHANGE UP (ref 150–400)
POTASSIUM SERPL-MCNC: 4.2 MMOL/L — SIGNIFICANT CHANGE UP (ref 3.5–5.3)
POTASSIUM SERPL-SCNC: 4.2 MMOL/L — SIGNIFICANT CHANGE UP (ref 3.5–5.3)
PROT SERPL-MCNC: 6.1 G/DL — SIGNIFICANT CHANGE UP (ref 6–8.3)
PROTHROM AB SERPL-ACNC: 13.1 SEC — SIGNIFICANT CHANGE UP (ref 10.6–13.6)
RBC # BLD: 4.03 M/UL — LOW (ref 4.2–5.8)
RBC # FLD: 14 % — SIGNIFICANT CHANGE UP (ref 10.3–14.5)
RH IG SCN BLD-IMP: POSITIVE — SIGNIFICANT CHANGE UP
SODIUM SERPL-SCNC: 143 MMOL/L — SIGNIFICANT CHANGE UP (ref 135–145)
WBC # BLD: 7.84 K/UL — SIGNIFICANT CHANGE UP (ref 3.8–10.5)
WBC # FLD AUTO: 7.84 K/UL — SIGNIFICANT CHANGE UP (ref 3.8–10.5)

## 2021-05-25 PROCEDURE — 99232 SBSQ HOSP IP/OBS MODERATE 35: CPT | Mod: GC

## 2021-05-25 PROCEDURE — 88304 TISSUE EXAM BY PATHOLOGIST: CPT | Mod: 26

## 2021-05-25 RX ORDER — HYDROMORPHONE HYDROCHLORIDE 2 MG/ML
0.5 INJECTION INTRAMUSCULAR; INTRAVENOUS; SUBCUTANEOUS
Refills: 0 | Status: DISCONTINUED | OUTPATIENT
Start: 2021-05-25 | End: 2021-05-26

## 2021-05-25 RX ORDER — HYDRALAZINE HCL 50 MG
5 TABLET ORAL ONCE
Refills: 0 | Status: COMPLETED | OUTPATIENT
Start: 2021-05-25 | End: 2021-05-25

## 2021-05-25 RX ORDER — OXYCODONE HYDROCHLORIDE 5 MG/1
5 TABLET ORAL EVERY 6 HOURS
Refills: 0 | Status: DISCONTINUED | OUTPATIENT
Start: 2021-05-25 | End: 2021-05-26

## 2021-05-25 RX ORDER — HYDRALAZINE HCL 50 MG
10 TABLET ORAL ONCE
Refills: 0 | Status: COMPLETED | OUTPATIENT
Start: 2021-05-25 | End: 2021-05-25

## 2021-05-25 RX ORDER — HYDROMORPHONE HYDROCHLORIDE 2 MG/ML
0.5 INJECTION INTRAMUSCULAR; INTRAVENOUS; SUBCUTANEOUS EVERY 8 HOURS
Refills: 0 | Status: DISCONTINUED | OUTPATIENT
Start: 2021-05-25 | End: 2021-05-26

## 2021-05-25 RX ORDER — OXYCODONE HYDROCHLORIDE 5 MG/1
10 TABLET ORAL EVERY 6 HOURS
Refills: 0 | Status: DISCONTINUED | OUTPATIENT
Start: 2021-05-25 | End: 2021-05-26

## 2021-05-25 RX ADMIN — Medication 25 MILLIGRAM(S): at 05:33

## 2021-05-25 RX ADMIN — SODIUM CHLORIDE 100 MILLILITER(S): 9 INJECTION, SOLUTION INTRAVENOUS at 03:24

## 2021-05-25 RX ADMIN — HEPARIN SODIUM 5000 UNIT(S): 5000 INJECTION INTRAVENOUS; SUBCUTANEOUS at 05:33

## 2021-05-25 RX ADMIN — Medication 10 MILLIGRAM(S): at 14:26

## 2021-05-25 RX ADMIN — HYDROMORPHONE HYDROCHLORIDE 0.5 MILLIGRAM(S): 2 INJECTION INTRAMUSCULAR; INTRAVENOUS; SUBCUTANEOUS at 18:02

## 2021-05-25 RX ADMIN — Medication 5 MILLIGRAM(S): at 19:43

## 2021-05-25 RX ADMIN — Medication 62.5 MILLIMOLE(S): at 14:04

## 2021-05-25 RX ADMIN — PIPERACILLIN AND TAZOBACTAM 200 GRAM(S): 4; .5 INJECTION, POWDER, LYOPHILIZED, FOR SOLUTION INTRAVENOUS at 09:57

## 2021-05-25 RX ADMIN — HEPARIN SODIUM 5000 UNIT(S): 5000 INJECTION INTRAVENOUS; SUBCUTANEOUS at 14:26

## 2021-05-25 RX ADMIN — HEPARIN SODIUM 5000 UNIT(S): 5000 INJECTION INTRAVENOUS; SUBCUTANEOUS at 22:28

## 2021-05-25 RX ADMIN — PIPERACILLIN AND TAZOBACTAM 200 GRAM(S): 4; .5 INJECTION, POWDER, LYOPHILIZED, FOR SOLUTION INTRAVENOUS at 04:34

## 2021-05-25 NOTE — PROGRESS NOTE ADULT - SUBJECTIVE AND OBJECTIVE BOX
GASTROENTEROLOGY PROGRESS NOTE  Patient seen and examined at bedside. No complaints this AM. Feels well, ready for surgery this afternoon. States color of urine improved already.     PERTINENT REVIEW OF SYSTEMS:  CONSTITUTIONAL: No weakness, fevers or chills  HEENT: No visual changes; No vertigo or throat pain   GASTROINTESTINAL: As above.  NEUROLOGICAL: No numbness or weakness  SKIN: No itching, burning, rashes, or lesions     Allergies    No Known Allergies    Intolerances      MEDICATIONS:  MEDICATIONS  (STANDING):  heparin   Injectable 5000 Unit(s) SubCutaneous every 8 hours  lactated ringers. 1000 milliLiter(s) (100 mL/Hr) IV Continuous <Continuous>  metoprolol succinate ER 25 milliGRAM(s) Oral daily  piperacillin/tazobactam IVPB.. 3.375 Gram(s) IV Intermittent every 6 hours    MEDICATIONS  (PRN):  acetaminophen   Tablet .. 650 milliGRAM(s) Oral every 6 hours PRN Mild Pain (1 - 3)  ondansetron Injectable 4 milliGRAM(s) IV Push every 6 hours PRN Nausea    Vital Signs Last 24 Hrs  T(C): 36.6 (25 May 2021 09:57), Max: 36.6 (24 May 2021 20:55)  T(F): 97.9 (25 May 2021 09:57), Max: 97.9 (25 May 2021 09:57)  HR: 55 (25 May 2021 09:57) (52 - 67)  BP: 156/66 (25 May 2021 09:57) (134/75 - 156/66)  BP(mean): 99 (24 May 2021 20:55) (99 - 99)  RR: 18 (25 May 2021 09:57) (16 - 18)  SpO2: 98% (25 May 2021 09:57) (96% - 98%)    05-24 @ 07:01  -  05-25 @ 07:00  --------------------------------------------------------  IN: 1800 mL / OUT: 2930 mL / NET: -1130 mL    05-25 @ 07:01  -  05-25 @ 10:21  --------------------------------------------------------  IN: 200 mL / OUT: 0 mL / NET: 200 mL      PHYSICAL EXAM:    General: Well developed; well nourished; in no acute distress  HEENT: MMM, conjunctiva and sclera clear  Gastrointestinal: Soft non-tender non-distended; No rebound or guarding  Skin: Warm and dry. No obvious rash    LABS:                        11.8   7.84  )-----------( 324      ( 25 May 2021 07:51 )             36.2     05-25    143  |  104  |  7   ----------------------------<  99  4.2   |  28  |  1.06    Ca    9.3      25 May 2021 07:51  Phos  2.7     05-25  Mg     2.0     05-25    TPro  6.1  /  Alb  3.1<L>  /  TBili  3.0<H>  /  DBili  x   /  AST  74<H>  /  ALT  98<H>  /  AlkPhos  974<H>  05-25    PT/INR - ( 25 May 2021 07:51 )   PT: 13.1 sec;   INR: 1.10          PTT - ( 25 May 2021 07:51 )  PTT:36.2 sec      Urinalysis Basic - ( 23 May 2021 13:55 )    Color: Dark Yellow / Appearance: Hazy / SG: >=1.030 / pH: x  Gluc: x / Ketone: NEGATIVE  / Bili: Moderate / Urobili: 1.0 E.U./dL   Blood: x / Protein: 30 mg/dL / Nitrite: NEGATIVE   Leuk Esterase: NEGATIVE / RBC: < 5 /HPF / WBC < 5 /HPF   Sq Epi: x / Non Sq Epi: 0-5 /HPF / Bacteria: None /HPF                Culture - Urine (collected 23 May 2021 14:21)  Source: .Urine Clean Catch (Midstream)  Final Report (24 May 2021 08:35):    No growth      RADIOLOGY & ADDITIONAL STUDIES:  Reviewed

## 2021-05-25 NOTE — PROGRESS NOTE ADULT - ASSESSMENT
66 YO M with PMH s/f HTN, Afib. CT AP which revealed dilated intra and extrahepatic bile ducts, CBD 1.4 cm and a 1 cm nonobstructive CBD stone. Admitted for ERCP. Medicine consulted for preop eval for lap gail tomorrow.     #Choledocholithiasis   Patient s/p ERCP,  11 mm CBD stone visualized in distal CBD and CBD dilated to 13 mm s/p sphincterotomy and balloon sweep  Plan per primary team   CW IV abx per team   pain control, trend CMP  For lap gail today    #Preop Eval  METs>10, performs ADLs independently   RCRI 1, Class II risk for KALLIE   Schumacher 0.0% KALLIE  Can proceed to OR without further cardiac workup     #Afib   On eliquis and Toprol at home   Hold eliquis preop, resume post op per primary team   CW Toprol 25mg QD     #HTN   On lisinopril 20mg QD at home  Hold preop at this time   If SBP>180, can consider IV labetolol 10mg or hydral 10mg IVP  Resume lisinopril post op   Ensure adequate pain control prior to treating BP    #HLD   On crestor 20mg QHS, hold in setting of transaminitis   F/U CMP from today to eval liver fx    #Anemia   No e/o bleeding at this time   Continue to trend CBC   monitor BM - had one yesterday and notes it was normal    #Hypokalemia   Replete >4    #sinus bradycardia   Per pt, at baseline   Monitor HR 64 YO M with PMH s/f HTN, Afib. CT AP which revealed dilated intra and extrahepatic bile ducts, CBD 1.4 cm and a 1 cm nonobstructive CBD stone. Admitted for ERCP. Medicine consulted for preop eval for lap gail tomorrow.     #Choledocholithiasis   Patient s/p ERCP,  11 mm CBD stone visualized in distal CBD and CBD dilated to 13 mm s/p sphincterotomy and balloon sweep  Plan per primary team   CW IV abx per team   pain control, trend CMP  For lap gail today    #Preop Eval  METs>10, performs ADLs independently   RCRI 1, Class II risk for KALLIE   Schumacher 0.0% KALLIE  Can proceed to OR without further cardiac workup     #Chronic Afib   On eliquis and Toprol at home   Hold eliquis preop, resume post op per primary team   CW Toprol 25mg QD     #HTN   On lisinopril 20mg QD at home  Hold preop at this time   If SBP>180, can consider IV labetolol 10mg or hydral 10mg IVP  Resume lisinopril post op   Ensure adequate pain control prior to treating BP    #HLD   On crestor 20mg QHS, hold in setting of transaminitis   F/U CMP from today to eval liver fx    #Anemia   No e/o bleeding at this time   Continue to trend CBC   monitor BM - had one yesterday and notes it was normal    #Hypokalemia   Replete >4    #sinus bradycardia   Per pt, at baseline   Monitor HR

## 2021-05-25 NOTE — PROGRESS NOTE ADULT - ASSESSMENT
64 YO M with PMH s/f HTN, Afib (s/p ablation 2 years ago, on Eliquis) and no significant PSH. Patient has had 35-40 pounds weight loss in the last month a/w nausea, vomitings not associated with food, darkening of urine color and jaundice. GI consulted for choledocholithiasis.     #Choledocholithiasis  - reportedly had outpatient imaging that showed 1 cm non-obstructing CBD stone  - cholestatic injury on labs  - s/p ERCP w/ sphincterotomy and balloon sweep  - liver tests downtrending  - remainder of care per Surgery    Thank you for allowing us to participate in the care of this patient.  GI will sign off. Please call back with any questions or concerns.     Jatinder Sutherland MD  PGY-4, Gastroenterology Fellow  pager: [phone]

## 2021-05-25 NOTE — PROGRESS NOTE ADULT - SUBJECTIVE AND OBJECTIVE BOX
SUBJECTIVE: Patient seen and examined bedside by chief resident. Patient reports       heparin   Injectable 5000 Unit(s) SubCutaneous every 8 hours  metoprolol succinate ER 25 milliGRAM(s) Oral daily  piperacillin/tazobactam IVPB.. 3.375 Gram(s) IV Intermittent every 6 hours      Vital Signs Last 24 Hrs  T(C): 36.1 (25 May 2021 04:36), Max: 36.6 (24 May 2021 08:45)  T(F): 97 (25 May 2021 04:36), Max: 97.8 (24 May 2021 08:45)  HR: 57 (25 May 2021 04:36) (52 - 67)  BP: 141/77 (25 May 2021 04:36) (134/75 - 167/86)  BP(mean): 99 (24 May 2021 20:55) (99 - 99)  RR: 18 (25 May 2021 04:36) (16 - 18)  SpO2: 98% (25 May 2021 04:36) (96% - 99%)  I&O's Detail    23 May 2021 07:01  -  24 May 2021 07:00  --------------------------------------------------------  IN:    Heparin Infusion: 56 mL    Lactated Ringers: 900 mL  Total IN: 956 mL    OUT:    Voided (mL): 1250 mL  Total OUT: 1250 mL    Total NET: -294 mL      24 May 2021 07:01  -  25 May 2021 06:50  --------------------------------------------------------  IN:    IV PiggyBack: 700 mL    Lactated Ringers: 1100 mL  Total IN: 1800 mL    OUT:    Voided (mL): 2930 mL  Total OUT: 2930 mL    Total NET: -1130 mL          PHYSICAL EXAMINATION   General: NAD  NEURO:  follows commands.   PULM: nonlabored breathing, no respiratory distress  ABD: soft, nondistended, appropriately tender, no rebound, no guarding, no tympany. Incisions CDI and without hematoma or erythema    EXTREM: WWP, no edema, no calf tenderness  VASC: No cyanosis,  no pallor.   PSYCH: Appropriate affect, answers questions appropriately      LABS:                        11.6   7.14  )-----------( 279      ( 24 May 2021 06:47 )             35.8     05-24    139  |  101  |  9   ----------------------------<  92  3.4<L>   |  27  |  0.97    Ca    9.1      24 May 2021 06:47  Phos  3.3     05-24  Mg     2.0     05-24    TPro  6.3  /  Alb  3.1<L>  /  TBili  5.7<H>  /  DBili  x   /  AST  133<H>  /  ALT  128<H>  /  AlkPhos  1126<H>  05-24    PT/INR - ( 23 May 2021 10:28 )   PT: 15.6 sec;   INR: 1.32          PTT - ( 23 May 2021 22:25 )  PTT:79.6 sec  Urinalysis Basic - ( 23 May 2021 13:55 )    Color: Dark Yellow / Appearance: Hazy / SG: >=1.030 / pH: x  Gluc: x / Ketone: NEGATIVE  / Bili: Moderate / Urobili: 1.0 E.U./dL   Blood: x / Protein: 30 mg/dL / Nitrite: NEGATIVE   Leuk Esterase: NEGATIVE / RBC: < 5 /HPF / WBC < 5 /HPF   Sq Epi: x / Non Sq Epi: 0-5 /HPF / Bacteria: None /HPF        SUBJECTIVE: Patient seen and examined bedside by chief resident. Patient reports feeling well and ready for the surgery.      heparin   Injectable 5000 Unit(s) SubCutaneous every 8 hours  metoprolol succinate ER 25 milliGRAM(s) Oral daily  piperacillin/tazobactam IVPB.. 3.375 Gram(s) IV Intermittent every 6 hours      Vital Signs Last 24 Hrs  T(C): 36.1 (25 May 2021 04:36), Max: 36.6 (24 May 2021 08:45)  T(F): 97 (25 May 2021 04:36), Max: 97.8 (24 May 2021 08:45)  HR: 57 (25 May 2021 04:36) (52 - 67)  BP: 141/77 (25 May 2021 04:36) (134/75 - 167/86)  BP(mean): 99 (24 May 2021 20:55) (99 - 99)  RR: 18 (25 May 2021 04:36) (16 - 18)  SpO2: 98% (25 May 2021 04:36) (96% - 99%)  I&O's Detail    23 May 2021 07:01  -  24 May 2021 07:00  --------------------------------------------------------  IN:    Heparin Infusion: 56 mL    Lactated Ringers: 900 mL  Total IN: 956 mL    OUT:    Voided (mL): 1250 mL  Total OUT: 1250 mL    Total NET: -294 mL      24 May 2021 07:01  -  25 May 2021 06:50  --------------------------------------------------------  IN:    IV PiggyBack: 700 mL    Lactated Ringers: 1100 mL  Total IN: 1800 mL    OUT:    Voided (mL): 2930 mL  Total OUT: 2930 mL    Total NET: -1130 mL          PHYSICAL EXAMINATION   General: NAD  NEURO:  follows commands.   PULM: nonlabored breathing, no respiratory distress  ABD: soft, tender at RUQ, ND.  EXTREM: WWP, no edema, no calf tenderness  VASC: No cyanosis,  no pallor.   PSYCH: Appropriate affect, answers questions appropriately      LABS:                        11.6   7.14  )-----------( 279      ( 24 May 2021 06:47 )             35.8     05-24    139  |  101  |  9   ----------------------------<  92  3.4<L>   |  27  |  0.97    Ca    9.1      24 May 2021 06:47  Phos  3.3     05-24  Mg     2.0     05-24    TPro  6.3  /  Alb  3.1<L>  /  TBili  5.7<H>  /  DBili  x   /  AST  133<H>  /  ALT  128<H>  /  AlkPhos  1126<H>  05-24    PT/INR - ( 23 May 2021 10:28 )   PT: 15.6 sec;   INR: 1.32          PTT - ( 23 May 2021 22:25 )  PTT:79.6 sec  Urinalysis Basic - ( 23 May 2021 13:55 )    Color: Dark Yellow / Appearance: Hazy / SG: >=1.030 / pH: x  Gluc: x / Ketone: NEGATIVE  / Bili: Moderate / Urobili: 1.0 E.U./dL   Blood: x / Protein: 30 mg/dL / Nitrite: NEGATIVE   Leuk Esterase: NEGATIVE / RBC: < 5 /HPF / WBC < 5 /HPF   Sq Epi: x / Non Sq Epi: 0-5 /HPF / Bacteria: None /HPF

## 2021-05-25 NOTE — DISCHARGE NOTE PROVIDER - NSDCCPCAREPLAN_GEN_ALL_CORE_FT
PRINCIPAL DISCHARGE DIAGNOSIS  Diagnosis: Choledocholithiasis with obstruction  Assessment and Plan of Treatment: 64 yo M w/ PMH s/f HTN, Afib (s/p ablation 2 years ago, on Eliquis) and no significant PSH. The patient has had 35-40 pounds weight loss in the last month a/w nausea, vomiting, not associated w/ food, darkening of urine color and jaundice. Saw his PCP last week and elevated LFTs were noted. Underwent outpatient CT A/P, which revealed dilated intra- and extra-hepatic bile ducts, CBD 1.4 cm and a 1 cm non-obstructive CBD stone. Now status post ERCP performed 5/24/2021: CBD dilated to 13 mm s/p sphincterotomy and balloon sweep with removal of 1.11 cm CBD stone. Decision was made to undergo laparoscopic cholecystectomy in this admission.   The procedure was uncomplicated and well tolerated by the patient. The post-operative course was uncomplicated. Diet was advanced as tolerated, pain was well controlled on medication. On day of discharge, patient had stable vital signs, was tolerating diet, voiding without assistance, and pain was controlled. The patient is to follow up in 2 weeks.

## 2021-05-25 NOTE — BRIEF OPERATIVE NOTE - OPERATION/FINDINGS
Entry into abdomen through supraumbilical west cutdown with insertion of 12mm port. 10 mm port inserted just under xiphoid under visualization, 5mm ports x2 in rt abdomen under visualization.   Clear anatomy appreciated, gallbladder skeletonized, critical view of safety achieved. Cystic artery clipped and cut, followed by clipping of Cystic duct. GB skeletonized further,  from the liver bed, contained in Endocatch bag and exteriorized via 12mm port. GB fossa suction-irrigated and hemostasis confirmed.   10 mm and 12 mm port site fascia closed with vicryl, skin incisions closed with monocryl.

## 2021-05-25 NOTE — DISCHARGE NOTE PROVIDER - DETAILS OF MALNUTRITION DIAGNOSIS/DIAGNOSES
This patient has been assessed with a concern for Malnutrition and was treated during this hospitalization for the following Nutrition diagnosis/diagnoses:     -  05/24/2021: Severe protein-calorie malnutrition

## 2021-05-25 NOTE — DISCHARGE NOTE PROVIDER - CARE PROVIDER_API CALL
Jae Burgos)  ColonRectal Surgery; Surgery  [address], [address]  [place] [address]  Phone: [phone]  Fax: [phone]  Established Patient  Follow Up Time: 2 weeks

## 2021-05-25 NOTE — DISCHARGE NOTE PROVIDER - INSTRUCTIONS
Diet for home:  Go easy on the fat. Avoid high-fat foods, fried and greasy foods, and fatty sauces and gravies for at least a week after surgery. Instead, choose fat-free or low-fat foods. Low-fat foods are those with no more than 3 grams of fat a serving. Check labels and follow the serving size listed.  Increase the fiber in your diet. This can help normalize bowel movements. Add soluble fiber, such as oats and barley, to your diet. But be sure to increase the amount of fiber slowly, such as over several weeks, because too much fiber at first can make gas and cramping worse.  Eat smaller, more-frequent meals. This may ensure a better mix with available bile. A healthy meal should include small amounts of lean protein, such as poultry, fish or fat-free dairy, along with vegetables, fruits and whole grains.

## 2021-05-25 NOTE — DISCHARGE NOTE PROVIDER - NSDCMRMEDTOKEN_GEN_ALL_CORE_FT
Crestor 20 mg oral tablet: 1 tab(s) orally once a day  Eliquis 5 mg oral tablet: 1 tab(s) orally 2 times a day  lisinopril 20 mg oral tablet: 1 tab(s) orally once a day  metoprolol succinate 25 mg oral tablet, extended release: 1 tab(s) orally once a day

## 2021-05-25 NOTE — PROGRESS NOTE ADULT - SUBJECTIVE AND OBJECTIVE BOX
POST-OPERATIVE NOTE    Procedure: Laparoscopic cholecystectomy    Diagnosis/Indication: Choledocholithiasis    Surgeon: Dr. Burgos    S: The patient tolerated the procedure well, he was examined at the bedside. His POC was WNL. The patient denies having any complaints at the moment. His pain is well-controlled w/ medications. He denies having chest pain, SOB, dizziness, chills, nausea, vomiting.    O:  T(C): 36.3 (05-25-21 @ 18:30), Max: 36.5 (05-25-21 @ 17:25)  T(F): 97.4 (05-25-21 @ 18:30), Max: 97.7 (05-25-21 @ 17:25)  HR: 64 (05-25-21 @ 18:30) (58 - 70)  BP: 154/72 (05-25-21 @ 18:30) (154/72 - 194/84)  RR: 22 (05-25-21 @ 18:30) (18 - 27)  SpO2: 96% (05-25-21 @ 18:30) (96% - 100%)  Wt(kg): --                        11.8   7.84  )-----------( 324      ( 25 May 2021 07:51 )             36.2     05-25    143  |  104  |  7   ----------------------------<  99  4.2   |  28  |  1.06    Ca    9.3      25 May 2021 07:51  Phos  2.7     05-25  Mg     2.0     05-25    TPro  6.1  /  Alb  3.1<L>  /  TBili  3.0<H>  /  DBili  x   /  AST  74<H>  /  ALT  98<H>  /  AlkPhos  974<H>  05-25      Gen: NAD, resting comfortably in bed  C/V: NSR, S1, S2 present  Pulm: Nonlabored breathing, no respiratory distress  Abd: soft, NT/ND, no rebound tenderness, guarding, rigidity, incisions C/D/I  Extrem: no edema  Neuro: AAOx3      A/P: 64 yo M w/ PMH s/f HTN, Afib (s/p ablation 2 years ago, on Eliquis) and no significant PSH. The patient has had 35-40 pounds weight loss in the last month a/w nausea, vomiting, not associated w/ food, darkening of urine color and jaundice. Saw his PCP last week and elevated LFTs were noted. He got CT A/P, which revealed dilated intra- and extra-hepatic bile ducts, CBD 1.4 cm and a 1 cm non-obstructive CBD stone. Now s/p Laparoscopic Cholecystectomy (5/25).    CLD/IVF  Pain and Nausea control  SCDs, SQH for DVT ppx.  OOBA, IS  AM labs

## 2021-05-25 NOTE — PROGRESS NOTE ADULT - ASSESSMENT
66 yo M w/ PMH s/f HTN, Afib (s/p ablation 2 years ago, on Eliquis), no PSH. Presenting with weight a/w nausea, vomiting, not associated w/ food, darkening of urine color and jaundice. Admitted: elevated LFT, CT with biliary ducts dilation, CBD 1.4 cm and a 1 cm non-obstructive CBD stone. Now status post ERCP: CBD dilated to 13 mm s/p sphincterotomy and balloon sweep.     Planning for lap gail today    NPO/IVF for surgery   Pain and Nausea control  Zosyn  SCDs, SQH for DVT ppx.  OOBA, IS  AM labs

## 2021-05-25 NOTE — DISCHARGE NOTE PROVIDER - HOSPITAL COURSE
64 yo M w/ PMH s/f HTN, Afib (s/p ablation 2 years ago, on Eliquis) and no significant PSH. The patient has had 35-40 pounds weight loss in the last month a/w nausea, vomiting, not associated w/ food, darkening of urine color and jaundice. Saw his PCP last week and elevated LFTs were noted. Underwent outpatient CT A/P, which revealed dilated intra- and extra-hepatic bile ducts, CBD 1.4 cm and a 1 cm non-obstructive CBD stone. Now status post ERCP performed 5/24/2021: CBD dilated to 13 mm s/p sphincterotomy and balloon sweep with removal of 1.11 cm CBD stone. Decision was made to undergo laparoscopic cholecystectomy in this admission.   The procedure was uncomplicated and well tolerated by the patient. The post-operative course was uncomplicated. Diet was advanced as tolerated, pain was well controlled on medication. On day of discharge, patient had stable vital signs, was tolerating diet, voiding without assistance, and pain was controlled. The patient is to follow up in 2 weeks.

## 2021-05-25 NOTE — DISCHARGE NOTE PROVIDER - NSDCFUADDINST_GEN_ALL_CORE_FT
General Discharge Instructions:  Please resume all regular home medications unless specifically advised not to take a particular medication. Also, please take any new medications as prescribed.  Please get plenty of rest, continue to ambulate several times per day, and drink adequate amounts of fluids. Avoid lifting weights greater than 5-10 lbs until you follow-up with your surgeon, who will instruct you further regarding activity restrictions.  Avoid driving or operating heavy machinery while taking pain medications.  Please follow-up with your surgeon and Primary Care Provider (PCP) as advised.  Incision Care:  *Please call your doctor or nurse practitioner if you have increased pain, swelling, redness, or drainage from the incision site.  *Avoid swimming and baths until your follow-up appointment.  *You may shower, and wash surgical incisions with a mild soap and warm water. Gently pat the area dry.    Warning Signs:  Please call your doctor or nurse practitioner if you experience the following:  *You experience new chest pain, pressure, squeezing or tightness.  *New or worsening cough, shortness of breath, or wheeze.  *If you are vomiting and cannot keep down fluids or your medications.  *You are getting dehydrated due to continued vomiting, diarrhea, or other reasons. Signs of dehydration include dry mouth, rapid heartbeat, or feeling dizzy or faint when standing.  *You see blood or dark/black material when you vomit or have a bowel movement.  *You experience burning when you urinate, have blood in your urine, or experience a discharge.  *Your pain is not improving within 8-12 hours or is not gone within 24 hours. Call or return immediately if your pain is getting worse, changes location, or moves to your chest or back.  *You have shaking chills, or fever greater than 101.5 degrees Fahrenheit or 38 degrees Celsius.  *Any change in your symptoms, or any new symptoms that concern you.   General Discharge Instructions:  Do not restart eliquis until Post Op day 3 (Friday May 28th)  Please resume all regular home medications unless specifically advised not to take a particular medication. Also, please take any new medications as prescribed.  Please get plenty of rest, continue to ambulate several times per day, and drink adequate amounts of fluids. Avoid lifting weights greater than 5-10 lbs until you follow-up with your surgeon, who will instruct you further regarding activity restrictions.  Avoid driving or operating heavy machinery while taking pain medications.  Please follow-up with your surgeon and Primary Care Provider (PCP) as advised.  Incision Care:  *Please call your doctor or nurse practitioner if you have increased pain, swelling, redness, or drainage from the incision site.  *Avoid swimming and baths until your follow-up appointment.  *You may shower, and wash surgical incisions with a mild soap and warm water. Gently pat the area dry.    Warning Signs:  Please call your doctor or nurse practitioner if you experience the following:  *You experience new chest pain, pressure, squeezing or tightness.  *New or worsening cough, shortness of breath, or wheeze.  *If you are vomiting and cannot keep down fluids or your medications.  *You are getting dehydrated due to continued vomiting, diarrhea, or other reasons. Signs of dehydration include dry mouth, rapid heartbeat, or feeling dizzy or faint when standing.  *You see blood or dark/black material when you vomit or have a bowel movement.  *You experience burning when you urinate, have blood in your urine, or experience a discharge.  *Your pain is not improving within 8-12 hours or is not gone within 24 hours. Call or return immediately if your pain is getting worse, changes location, or moves to your chest or back.  *You have shaking chills, or fever greater than 101.5 degrees Fahrenheit or 38 degrees Celsius.  *Any change in your symptoms, or any new symptoms that concern you.   General Discharge Instructions:  * Do not restart eliquis until Post Op day 3 (Friday May 28th)  Take Tylenol/Motrin as needed for pain  Please resume all regular home medications unless specifically advised not to take a particular medication. Also, please take any new medications as prescribed.  Please get plenty of rest, continue to ambulate several times per day, and drink adequate amounts of fluids. Avoid lifting weights greater than 5-10 lbs until you follow-up with your surgeon, who will instruct you further regarding activity restrictions.  Avoid driving or operating heavy machinery while taking pain medications.  Please follow-up with your surgeon and Primary Care Provider (PCP) as advised.  Incision Care:  *Please call your doctor or nurse practitioner if you have increased pain, swelling, redness, or drainage from the incision site.  *Avoid swimming and baths until your follow-up appointment.  *You may shower, and wash surgical incisions with a mild soap and warm water. Gently pat the area dry.    Warning Signs:  Please call your doctor or nurse practitioner if you experience the following:  *You experience new chest pain, pressure, squeezing or tightness.  *New or worsening cough, shortness of breath, or wheeze.  *If you are vomiting and cannot keep down fluids or your medications.  *You are getting dehydrated due to continued vomiting, diarrhea, or other reasons. Signs of dehydration include dry mouth, rapid heartbeat, or feeling dizzy or faint when standing.  *You see blood or dark/black material when you vomit or have a bowel movement.  *You experience burning when you urinate, have blood in your urine, or experience a discharge.  *Your pain is not improving within 8-12 hours or is not gone within 24 hours. Call or return immediately if your pain is getting worse, changes location, or moves to your chest or back.  *You have shaking chills, or fever greater than 101.5 degrees Fahrenheit or 38 degrees Celsius.  *Any change in your symptoms, or any new symptoms that concern you.

## 2021-05-25 NOTE — PROGRESS NOTE ADULT - SUBJECTIVE AND OBJECTIVE BOX
Patient is a 65y old  Male who presents with a chief complaint of Jaundice 2/2 non-obstructive (25 May 2021 06:49)      INTERVAL HPI/OVERNIGHT EVENTS:  Patient was seen and examined at bedside. As per nurse and patient, no o/n events, patient resting comfortably. No complaints at this time. Ready for OR today, hopeful to eat solid food soon. Patient denies: fever, chills, dizziness, weakness, HA, Changes in vision, CP, palpitations, SOB, cough, N/V/D/C, dysuria, changes in bowel movements, LE edema.      PAST MEDICAL & SURGICAL HISTORY:  Hypertension    High cholesterol    Atrial fibrillation    No significant past surgical history        SOCIAL HISTORY  Alcohol:  Tobacco:  Illicit substance use:      FAMILY HISTORY:    REVIEW OF SYSTEMS:  CONSTITUTIONAL: No fever, weight loss, or fatigue  EYES: No eye pain, visual disturbances, or discharge  ENMT:  No difficulty hearing, tinnitus, vertigo; No sinus or throat pain  NECK: No pain or stiffness  RESPIRATORY: No cough, wheezing, chills or hemoptysis; No shortness of breath  CARDIOVASCULAR: No chest pain, palpitations, dizziness, or leg swelling  GASTROINTESTINAL: No abdominal or epigastric pain. No nausea, vomiting, or hematemesis; No diarrhea or constipation. No melena or hematochezia.  GENITOURINARY: No dysuria, frequency, hematuria, or incontinence  NEUROLOGICAL: No headaches, memory loss, loss of strength, numbness, or tremors  SKIN: No itching, burning, rashes, or lesions   LYMPH NODES: No enlarged glands  ENDOCRINE: No heat or cold intolerance; No hair loss  MUSCULOSKELETAL: No joint pain or swelling; No muscle, back, or extremity pain  PSYCHIATRIC: No depression, anxiety, mood swings, or difficulty sleeping  HEME/LYMPH: No easy bruising, or bleeding gums  ALLERY AND IMMUNOLOGIC: No hives or eczema    T(C): 36.1 (05-25-21 @ 04:36), Max: 36.6 (05-24-21 @ 20:55)  HR: 57 (05-25-21 @ 04:36) (52 - 67)  BP: 141/77 (05-25-21 @ 04:36) (134/75 - 143/71)  RR: 18 (05-25-21 @ 04:36) (16 - 18)  SpO2: 98% (05-25-21 @ 04:36) (96% - 98%)  Wt(kg): --  I&O's Summary    24 May 2021 07:01  -  25 May 2021 07:00  --------------------------------------------------------  IN: 1800 mL / OUT: 2930 mL / NET: -1130 mL        PHYSICAL EXAM:  GENERAL: NAD, laying comfortably in bed  HEAD:  Atraumatic, Normocephalic  EYES: EOMI, PERRLA, sclera icterus improved from yesterday  ENMT: No tonsillar erythema, exudates, or enlargement  NECK: Supple, No JVD  NERVOUS SYSTEM:  Alert & Oriented X3, no focal deficits   CHEST/LUNG: Clear to percussion bilaterally; No rales, rhonchi, wheezing, or rubs  HEART: Regular rate and rhythm; No murmurs, rubs, or gallops  ABDOMEN: Soft, minimal RUQ tenderness, Nondistended; Bowel sounds present  EXTREMITIES:  2+ Peripheral Pulses, No clubbing, cyanosis, or edema  LYMPH: No lymphadenopathy noted  SKIN: jaundice improved        LABS:                        11.8   7.84  )-----------( 324      ( 25 May 2021 07:51 )             36.2     05-24    139  |  101  |  9   ----------------------------<  92  3.4<L>   |  27  |  0.97    Ca    9.1      24 May 2021 06:47  Phos  3.3     05-24  Mg     2.0     05-24    TPro  6.3  /  Alb  3.1<L>  /  TBili  5.7<H>  /  DBili  x   /  AST  133<H>  /  ALT  128<H>  /  AlkPhos  1126<H>  05-24    PT/INR - ( 25 May 2021 07:51 )   PT: 13.1 sec;   INR: 1.10          PTT - ( 25 May 2021 07:51 )  PTT:36.2 sec  Urinalysis Basic - ( 23 May 2021 13:55 )    Color: Dark Yellow / Appearance: Hazy / SG: >=1.030 / pH: x  Gluc: x / Ketone: NEGATIVE  / Bili: Moderate / Urobili: 1.0 E.U./dL   Blood: x / Protein: 30 mg/dL / Nitrite: NEGATIVE   Leuk Esterase: NEGATIVE / RBC: < 5 /HPF / WBC < 5 /HPF   Sq Epi: x / Non Sq Epi: 0-5 /HPF / Bacteria: None /HPF      CAPILLARY BLOOD GLUCOSE            Urinalysis Basic - ( 23 May 2021 13:55 )    Color: Dark Yellow / Appearance: Hazy / SG: >=1.030 / pH: x  Gluc: x / Ketone: NEGATIVE  / Bili: Moderate / Urobili: 1.0 E.U./dL   Blood: x / Protein: 30 mg/dL / Nitrite: NEGATIVE   Leuk Esterase: NEGATIVE / RBC: < 5 /HPF / WBC < 5 /HPF   Sq Epi: x / Non Sq Epi: 0-5 /HPF / Bacteria: None /HPF        MEDICATIONS  (STANDING):  heparin   Injectable 5000 Unit(s) SubCutaneous every 8 hours  lactated ringers. 1000 milliLiter(s) (100 mL/Hr) IV Continuous <Continuous>  metoprolol succinate ER 25 milliGRAM(s) Oral daily  piperacillin/tazobactam IVPB.. 3.375 Gram(s) IV Intermittent every 6 hours    MEDICATIONS  (PRN):  acetaminophen   Tablet .. 650 milliGRAM(s) Oral every 6 hours PRN Mild Pain (1 - 3)  ondansetron Injectable 4 milliGRAM(s) IV Push every 6 hours PRN Nausea      RADIOLOGY & ADDITIONAL TESTS:    Imaging Personally Reviewed:  [ ] YES  [ ] NO    Consultant(s) Notes Reviewed:  [ ] YES  [ ] NO    Care Discussed with Consultants/Other Providers [ ] YES  [ ] NO

## 2021-05-26 VITALS
OXYGEN SATURATION: 97 % | TEMPERATURE: 98 F | RESPIRATION RATE: 17 BRPM | DIASTOLIC BLOOD PRESSURE: 72 MMHG | SYSTOLIC BLOOD PRESSURE: 152 MMHG | HEART RATE: 70 BPM

## 2021-05-26 LAB
ALBUMIN SERPL ELPH-MCNC: 3.4 G/DL — SIGNIFICANT CHANGE UP (ref 3.3–5)
ALP SERPL-CCNC: 890 U/L — HIGH (ref 40–120)
ALT FLD-CCNC: 93 U/L — HIGH (ref 10–45)
ANION GAP SERPL CALC-SCNC: 12 MMOL/L — SIGNIFICANT CHANGE UP (ref 5–17)
AST SERPL-CCNC: 63 U/L — HIGH (ref 10–40)
BILIRUB SERPL-MCNC: 2.4 MG/DL — HIGH (ref 0.2–1.2)
BUN SERPL-MCNC: 8 MG/DL — SIGNIFICANT CHANGE UP (ref 7–23)
CALCIUM SERPL-MCNC: 9.5 MG/DL — SIGNIFICANT CHANGE UP (ref 8.4–10.5)
CHLORIDE SERPL-SCNC: 101 MMOL/L — SIGNIFICANT CHANGE UP (ref 96–108)
CO2 SERPL-SCNC: 28 MMOL/L — SIGNIFICANT CHANGE UP (ref 22–31)
CREAT SERPL-MCNC: 0.83 MG/DL — SIGNIFICANT CHANGE UP (ref 0.5–1.3)
GLUCOSE SERPL-MCNC: 128 MG/DL — HIGH (ref 70–99)
HCT VFR BLD CALC: 38.7 % — LOW (ref 39–50)
HGB BLD-MCNC: 12.6 G/DL — LOW (ref 13–17)
MAGNESIUM SERPL-MCNC: 1.8 MG/DL — SIGNIFICANT CHANGE UP (ref 1.6–2.6)
MCHC RBC-ENTMCNC: 29.1 PG — SIGNIFICANT CHANGE UP (ref 27–34)
MCHC RBC-ENTMCNC: 32.6 GM/DL — SIGNIFICANT CHANGE UP (ref 32–36)
MCV RBC AUTO: 89.4 FL — SIGNIFICANT CHANGE UP (ref 80–100)
NRBC # BLD: 0 /100 WBCS — SIGNIFICANT CHANGE UP (ref 0–0)
PHOSPHATE SERPL-MCNC: 2.7 MG/DL — SIGNIFICANT CHANGE UP (ref 2.5–4.5)
PLATELET # BLD AUTO: 430 K/UL — HIGH (ref 150–400)
POTASSIUM SERPL-MCNC: 4.2 MMOL/L — SIGNIFICANT CHANGE UP (ref 3.5–5.3)
POTASSIUM SERPL-SCNC: 4.2 MMOL/L — SIGNIFICANT CHANGE UP (ref 3.5–5.3)
PROT SERPL-MCNC: 6.8 G/DL — SIGNIFICANT CHANGE UP (ref 6–8.3)
RBC # BLD: 4.33 M/UL — SIGNIFICANT CHANGE UP (ref 4.2–5.8)
RBC # FLD: 13.6 % — SIGNIFICANT CHANGE UP (ref 10.3–14.5)
SODIUM SERPL-SCNC: 141 MMOL/L — SIGNIFICANT CHANGE UP (ref 135–145)
WBC # BLD: 12.48 K/UL — HIGH (ref 3.8–10.5)
WBC # FLD AUTO: 12.48 K/UL — HIGH (ref 3.8–10.5)

## 2021-05-26 PROCEDURE — 86140 C-REACTIVE PROTEIN: CPT

## 2021-05-26 PROCEDURE — 71045 X-RAY EXAM CHEST 1 VIEW: CPT

## 2021-05-26 PROCEDURE — 86304 IMMUNOASSAY TUMOR CA 125: CPT

## 2021-05-26 PROCEDURE — 76705 ECHO EXAM OF ABDOMEN: CPT

## 2021-05-26 PROCEDURE — 86901 BLOOD TYPING SEROLOGIC RH(D): CPT

## 2021-05-26 PROCEDURE — 84100 ASSAY OF PHOSPHORUS: CPT

## 2021-05-26 PROCEDURE — 85652 RBC SED RATE AUTOMATED: CPT

## 2021-05-26 PROCEDURE — 85730 THROMBOPLASTIN TIME PARTIAL: CPT

## 2021-05-26 PROCEDURE — 85610 PROTHROMBIN TIME: CPT

## 2021-05-26 PROCEDURE — 84145 PROCALCITONIN (PCT): CPT

## 2021-05-26 PROCEDURE — C1769: CPT

## 2021-05-26 PROCEDURE — 85025 COMPLETE CBC W/AUTO DIFF WBC: CPT

## 2021-05-26 PROCEDURE — 96374 THER/PROPH/DIAG INJ IV PUSH: CPT

## 2021-05-26 PROCEDURE — 83690 ASSAY OF LIPASE: CPT

## 2021-05-26 PROCEDURE — 82378 CARCINOEMBRYONIC ANTIGEN: CPT

## 2021-05-26 PROCEDURE — C1889: CPT

## 2021-05-26 PROCEDURE — 87635 SARS-COV-2 COVID-19 AMP PRB: CPT

## 2021-05-26 PROCEDURE — 80053 COMPREHEN METABOLIC PANEL: CPT

## 2021-05-26 PROCEDURE — 86769 SARS-COV-2 COVID-19 ANTIBODY: CPT

## 2021-05-26 PROCEDURE — 99285 EMERGENCY DEPT VISIT HI MDM: CPT | Mod: 25

## 2021-05-26 PROCEDURE — 93005 ELECTROCARDIOGRAM TRACING: CPT

## 2021-05-26 PROCEDURE — 86301 IMMUNOASSAY TUMOR CA 19-9: CPT

## 2021-05-26 PROCEDURE — 88304 TISSUE EXAM BY PATHOLOGIST: CPT

## 2021-05-26 PROCEDURE — 82105 ALPHA-FETOPROTEIN SERUM: CPT

## 2021-05-26 PROCEDURE — 86850 RBC ANTIBODY SCREEN: CPT

## 2021-05-26 PROCEDURE — 36415 COLL VENOUS BLD VENIPUNCTURE: CPT

## 2021-05-26 PROCEDURE — 99232 SBSQ HOSP IP/OBS MODERATE 35: CPT | Mod: GC

## 2021-05-26 PROCEDURE — 83735 ASSAY OF MAGNESIUM: CPT

## 2021-05-26 PROCEDURE — 81001 URINALYSIS AUTO W/SCOPE: CPT

## 2021-05-26 PROCEDURE — 86803 HEPATITIS C AB TEST: CPT

## 2021-05-26 PROCEDURE — 87086 URINE CULTURE/COLONY COUNT: CPT

## 2021-05-26 PROCEDURE — 85027 COMPLETE CBC AUTOMATED: CPT

## 2021-05-26 PROCEDURE — 86900 BLOOD TYPING SEROLOGIC ABO: CPT

## 2021-05-26 RX ORDER — LISINOPRIL 2.5 MG/1
20 TABLET ORAL DAILY
Refills: 0 | Status: DISCONTINUED | OUTPATIENT
Start: 2021-05-26 | End: 2021-05-26

## 2021-05-26 RX ORDER — SODIUM,POTASSIUM PHOSPHATES 278-250MG
1 POWDER IN PACKET (EA) ORAL ONCE
Refills: 0 | Status: COMPLETED | OUTPATIENT
Start: 2021-05-26 | End: 2021-05-26

## 2021-05-26 RX ORDER — MAGNESIUM SULFATE 500 MG/ML
1 VIAL (ML) INJECTION ONCE
Refills: 0 | Status: COMPLETED | OUTPATIENT
Start: 2021-05-26 | End: 2021-05-26

## 2021-05-26 RX ADMIN — Medication 25 MILLIGRAM(S): at 06:49

## 2021-05-26 RX ADMIN — Medication 100 GRAM(S): at 12:48

## 2021-05-26 RX ADMIN — HEPARIN SODIUM 5000 UNIT(S): 5000 INJECTION INTRAVENOUS; SUBCUTANEOUS at 06:49

## 2021-05-26 RX ADMIN — HEPARIN SODIUM 5000 UNIT(S): 5000 INJECTION INTRAVENOUS; SUBCUTANEOUS at 14:19

## 2021-05-26 RX ADMIN — SODIUM CHLORIDE 100 MILLILITER(S): 9 INJECTION, SOLUTION INTRAVENOUS at 01:34

## 2021-05-26 RX ADMIN — LISINOPRIL 20 MILLIGRAM(S): 2.5 TABLET ORAL at 12:47

## 2021-05-26 RX ADMIN — SODIUM CHLORIDE 100 MILLILITER(S): 9 INJECTION, SOLUTION INTRAVENOUS at 12:48

## 2021-05-26 RX ADMIN — Medication 1 PACKET(S): at 13:32

## 2021-05-26 NOTE — PROGRESS NOTE ADULT - REASON FOR ADMISSION
Jaundice 2/2 non-obstructive

## 2021-05-26 NOTE — PROGRESS NOTE ADULT - ASSESSMENT
A/P:  64 yo M w/ PMH s/f HTN, Afib (s/p ablation 2 years ago, on Eliquis) and no significant PSH. The patient has had 35-40 pounds weight loss in the last month a/w nausea, vomiting, not associated w/ food, darkening of urine color and jaundice. Saw his PCP last week and elevated LFTs were noted. He got CT A/P, which revealed dilated intra- and extra-hepatic bile ducts, CBD 1.4 cm and a 1 cm non-obstructive CBD stone. Now s/p Laparoscopic Cholecystectomy (5/25).    CLD/IVF  Pain and Nausea control  SCDs, SQH for DVT ppx.  OOBA, IS  AM labs

## 2021-05-26 NOTE — PROGRESS NOTE ADULT - NUTRITIONAL ASSESSMENT
This patient has been assessed with a concern for Malnutrition and has been determined to have a diagnosis/diagnoses of Severe protein-calorie malnutrition.    This patient is being managed with:   Diet Clear Liquid-  Entered: May 24 2021  3:31PM    Diet NPO after Midnight-     NPO Start Date: 24-May-2021   NPO Start Time: 23:59  Except Medications  Entered: May 24 2021  3:30PM    
This patient has been assessed with a concern for Malnutrition and has been determined to have a diagnosis/diagnoses of Severe protein-calorie malnutrition.    This patient is being managed with:   Diet Clear Liquid-  Entered: May 24 2021  3:31PM    Diet NPO after Midnight-     NPO Start Date: 24-May-2021   NPO Start Time: 23:59  Except Medications  Entered: May 24 2021  3:30PM    
This patient has been assessed with a concern for Malnutrition and has been determined to have a diagnosis/diagnoses of Severe protein-calorie malnutrition.    This patient is being managed with:   Diet Clear Liquid-  Entered: May 24 2021  3:31PM    
This patient has been assessed with a concern for Malnutrition and has been determined to have a diagnosis/diagnoses of Severe protein-calorie malnutrition.    This patient is being managed with:   Diet Regular-  Low Fat (LOWFAT)  Entered: May 26 2021  9:32AM

## 2021-05-26 NOTE — PROGRESS NOTE ADULT - ASSESSMENT
66 YO M with PMH s/f HTN, Afib. CT AP which revealed dilated intra and extrahepatic bile ducts, CBD 1.4 cm and a 1 cm nonobstructive CBD stone. Admitted for ERCP. Medicine consulted for preop eval for lap gail tomorrow.     #Choledocholithiasis   Patient s/p ERCP,  11 mm CBD stone visualized in distal CBD and CBD dilated to 13 mm s/p sphincterotomy and balloon sweep  Plan per primary team   pain control, trend CMP  S/P lap gail   Leukocytosis likely 2/2 post op   Transaminitis improving    #Chronic Afib   On eliquis and Toprol at home   Hold eliquis preop, resume post op per primary team   CW Toprol 25mg QD     #HTN   On lisinopril 20mg QD at home  Restart lisinopril 20mg QD  Ensure adequate pain control prior to treating BP    #HLD   On crestor 20mg QHS, hold in setting of transaminitis   F/U CMP from today to eval liver fx    #Anemia   No e/o bleeding at this time   Continue to trend CBC   monitor BM   #Hypokalemia   Replete >4    #sinus bradycardia   Per pt, at baseline   Monitor HR

## 2021-05-26 NOTE — PROGRESS NOTE ADULT - SUBJECTIVE AND OBJECTIVE BOX
Patient is a 65y old  Male who presents with a chief complaint of Jaundice 2/2 non-obstructive (26 May 2021 06:56)      INTERVAL HPI/OVERNIGHT EVENTS:  Patient was seen and examined at bedside. As per nurse and patient, no o/n events, patient resting comfortably. No complaints at this time. Patient denies: fever, chills, dizziness, weakness, HA, Changes in vision, CP, palpitations, SOB, cough, N/V/D/C, dysuria, changes in bowel movements, LE edema.      PAST MEDICAL & SURGICAL HISTORY:  Hypertension    High cholesterol    Atrial fibrillation    No significant past surgical history        SOCIAL HISTORY  Alcohol:  Tobacco:  Illicit substance use:      FAMILY HISTORY:    REVIEW OF SYSTEMS:  CONSTITUTIONAL: No fever, weight loss, or fatigue  EYES: No eye pain, visual disturbances, or discharge  ENMT:  No difficulty hearing, tinnitus, vertigo; No sinus or throat pain  NECK: No pain or stiffness  RESPIRATORY: No cough, wheezing, chills or hemoptysis; No shortness of breath  CARDIOVASCULAR: No chest pain, palpitations, dizziness, or leg swelling  GASTROINTESTINAL: No abdominal or epigastric pain. No nausea, vomiting, or hematemesis; No diarrhea or constipation. No melena or hematochezia.  GENITOURINARY: No dysuria, frequency, hematuria, or incontinence  NEUROLOGICAL: No headaches, memory loss, loss of strength, numbness, or tremors  SKIN: No itching, burning, rashes, or lesions   LYMPH NODES: No enlarged glands  ENDOCRINE: No heat or cold intolerance; No hair loss  MUSCULOSKELETAL: No joint pain or swelling; No muscle, back, or extremity pain  PSYCHIATRIC: No depression, anxiety, mood swings, or difficulty sleeping  HEME/LYMPH: No easy bruising, or bleeding gums  ALLERY AND IMMUNOLOGIC: No hives or eczema    T(C): 36.5 (05-26-21 @ 12:40), Max: 36.8 (05-26-21 @ 01:34)  HR: 70 (05-26-21 @ 12:40) (58 - 71)  BP: 152/72 (05-26-21 @ 12:40) (143/70 - 194/84)  RR: 17 (05-26-21 @ 12:40) (17 - 27)  SpO2: 97% (05-26-21 @ 12:40) (96% - 100%)  Wt(kg): --  I&O's Summary    25 May 2021 07:01  -  26 May 2021 07:00  --------------------------------------------------------  IN: 900 mL / OUT: 3700 mL / NET: -2800 mL    26 May 2021 07:01  -  26 May 2021 14:22  --------------------------------------------------------  IN: 0 mL / OUT: 850 mL / NET: -850 mL        PHYSICAL EXAM:  GENERAL: NAD, laying comfortably in bed  HEAD:  Atraumatic, Normocephalic  EYES: EOMI, PERRLA, conjunctiva and sclera icterus minimally  ENMT: No tonsillar erythema, exudates, or enlargement; MMM  NECK: Supple, No JVD  NERVOUS SYSTEM:  Alert & Oriented X3, no focal deficits   CHEST/LUNG: Clear to percussion bilaterally; No rales, rhonchi, wheezing, or rubs  HEART: Regular rate and rhythm; No murmurs, rubs, or gallops  ABDOMEN: Soft, Nontender, Nondistended; Incisions C/D/I  EXTREMITIES:  2+ Peripheral Pulses, No clubbing, cyanosis, or edema  LYMPH: No lymphadenopathy noted  SKIN: Jaundice has almost resolved        LABS:                        12.6   12.48 )-----------( 430      ( 26 May 2021 09:17 )             38.7     05-26    141  |  101  |  8   ----------------------------<  128<H>  4.2   |  28  |  0.83    Ca    9.5      26 May 2021 09:17  Phos  2.7     05-26  Mg     1.8     05-26    TPro  6.8  /  Alb  3.4  /  TBili  2.4<H>  /  DBili  x   /  AST  63<H>  /  ALT  93<H>  /  AlkPhos  890<H>  05-26    PT/INR - ( 25 May 2021 07:51 )   PT: 13.1 sec;   INR: 1.10          PTT - ( 25 May 2021 07:51 )  PTT:36.2 sec    CAPILLARY BLOOD GLUCOSE                MEDICATIONS  (STANDING):  heparin   Injectable 5000 Unit(s) SubCutaneous every 8 hours  HYDROmorphone  Injectable 0.5 milliGRAM(s) IV Push every 30 minutes  lactated ringers. 1000 milliLiter(s) (100 mL/Hr) IV Continuous <Continuous>  lisinopril 20 milliGRAM(s) Oral daily  metoprolol succinate ER 25 milliGRAM(s) Oral daily    MEDICATIONS  (PRN):  acetaminophen   Tablet .. 650 milliGRAM(s) Oral every 6 hours PRN Mild Pain (1 - 3)  HYDROmorphone  Injectable 0.5 milliGRAM(s) IV Push every 8 hours PRN breakthrough pain  ondansetron Injectable 4 milliGRAM(s) IV Push every 6 hours PRN Nausea  oxyCODONE    IR 5 milliGRAM(s) Oral every 6 hours PRN Moderate Pain (4 - 6)  oxyCODONE    IR 10 milliGRAM(s) Oral every 6 hours PRN Severe Pain (7 - 10)      RADIOLOGY & ADDITIONAL TESTS:    Imaging Personally Reviewed:  [ ] YES  [ ] NO    Consultant(s) Notes Reviewed:  [ ] YES  [ ] NO    Care Discussed with Consultants/Other Providers [ ] YES  [ ] NO

## 2021-05-26 NOTE — PROGRESS NOTE ADULT - SUBJECTIVE AND OBJECTIVE BOX
SUBJECTIVE: Patient is POD 1. Seen and examined bedside. Tolerating clear liquid diet, ambulating well. Urinating spontaneously. Patient has no complaints. Denies any fever, chills, nausea, vomiting, chest pain, or shortness of breath.    heparin   Injectable 5000 Unit(s) SubCutaneous every 8 hours  metoprolol succinate ER 25 milliGRAM(s) Oral daily      Vital Signs Last 24 Hrs  T(C): 36.5 (26 May 2021 05:08), Max: 36.8 (25 May 2021 13:36)  T(F): 97.7 (26 May 2021 05:08), Max: 98.2 (25 May 2021 13:36)  HR: 63 (26 May 2021 05:08) (55 - 71)  BP: 151/64 (26 May 2021 05:08) (143/70 - 194/84)  BP(mean): 104 (25 May 2021 18:30) (104 - 125)  RR: 18 (26 May 2021 05:08) (16 - 27)  SpO2: 97% (26 May 2021 05:08) (96% - 100%)  I&O's Detail    24 May 2021 07:01  -  25 May 2021 07:00  --------------------------------------------------------  IN:    IV PiggyBack: 700 mL    Lactated Ringers: 1100 mL  Total IN: 1800 mL    OUT:    Voided (mL): 2930 mL  Total OUT: 2930 mL    Total NET: -1130 mL      25 May 2021 07:01  -  26 May 2021 06:57  --------------------------------------------------------  IN:    Lactated Ringers: 900 mL  Total IN: 900 mL    OUT:    Voided (mL): 3700 mL  Total OUT: 3700 mL    Total NET: -2800 mL          General: NAD, resting comfortably in bed  C/V: NSR  Pulm: Nonlabored breathing, no respiratory distress  Abd: soft, NT/ND. Laparoscopic incision sites clean, dry and intact.   Extrem: WWP, no edema, SCDs in place        LABS:                        11.8   7.84  )-----------( 324      ( 25 May 2021 07:51 )             36.2     05-25    143  |  104  |  7   ----------------------------<  99  4.2   |  28  |  1.06    Ca    9.3      25 May 2021 07:51  Phos  2.7     05-25  Mg     2.0     05-25    TPro  6.1  /  Alb  3.1<L>  /  TBili  3.0<H>  /  DBili  x   /  AST  74<H>  /  ALT  98<H>  /  AlkPhos  974<H>  05-25    PT/INR - ( 25 May 2021 07:51 )   PT: 13.1 sec;   INR: 1.10          PTT - ( 25 May 2021 07:51 )  PTT:36.2 sec      RADIOLOGY & ADDITIONAL STUDIES:

## 2021-06-01 LAB — SURGICAL PATHOLOGY STUDY: SIGNIFICANT CHANGE UP

## 2021-06-07 DIAGNOSIS — E43 UNSPECIFIED SEVERE PROTEIN-CALORIE MALNUTRITION: ICD-10-CM

## 2021-06-07 DIAGNOSIS — D64.9 ANEMIA, UNSPECIFIED: ICD-10-CM

## 2021-06-07 DIAGNOSIS — R17 UNSPECIFIED JAUNDICE: ICD-10-CM

## 2021-06-07 DIAGNOSIS — R74.01 ELEVATION OF LEVELS OF LIVER TRANSAMINASE LEVELS: ICD-10-CM

## 2021-06-07 DIAGNOSIS — K80.51 CALCULUS OF BILE DUCT WITHOUT CHOLANGITIS OR CHOLECYSTITIS WITH OBSTRUCTION: ICD-10-CM

## 2021-06-07 DIAGNOSIS — R00.1 BRADYCARDIA, UNSPECIFIED: ICD-10-CM

## 2021-06-07 DIAGNOSIS — I48.20 CHRONIC ATRIAL FIBRILLATION, UNSPECIFIED: ICD-10-CM

## 2021-06-07 DIAGNOSIS — E78.5 HYPERLIPIDEMIA, UNSPECIFIED: ICD-10-CM

## 2021-06-07 DIAGNOSIS — E87.6 HYPOKALEMIA: ICD-10-CM

## 2021-06-07 DIAGNOSIS — K63.5 POLYP OF COLON: ICD-10-CM

## 2021-06-07 DIAGNOSIS — I10 ESSENTIAL (PRIMARY) HYPERTENSION: ICD-10-CM
